# Patient Record
Sex: MALE | Race: ASIAN | NOT HISPANIC OR LATINO | Employment: UNEMPLOYED | URBAN - METROPOLITAN AREA
[De-identification: names, ages, dates, MRNs, and addresses within clinical notes are randomized per-mention and may not be internally consistent; named-entity substitution may affect disease eponyms.]

---

## 2018-01-05 ENCOUNTER — RECORDS - HEALTHEAST (OUTPATIENT)
Dept: LAB | Facility: CLINIC | Age: 53
End: 2018-01-05

## 2018-01-05 LAB
ALBUMIN SERPL-MCNC: 3.9 G/DL (ref 3.5–5)
ALP SERPL-CCNC: 51 U/L (ref 45–120)
ALT SERPL W P-5'-P-CCNC: 26 U/L (ref 0–45)
ANION GAP SERPL CALCULATED.3IONS-SCNC: 8 MMOL/L (ref 5–18)
AST SERPL W P-5'-P-CCNC: 26 U/L (ref 0–40)
BILIRUB SERPL-MCNC: 0.8 MG/DL (ref 0–1)
BUN SERPL-MCNC: 19 MG/DL (ref 8–22)
CALCIUM SERPL-MCNC: 9 MG/DL (ref 8.5–10.5)
CHLORIDE BLD-SCNC: 106 MMOL/L (ref 98–107)
CHOLEST SERPL-MCNC: 213 MG/DL
CO2 SERPL-SCNC: 27 MMOL/L (ref 22–31)
CREAT SERPL-MCNC: 1.02 MG/DL (ref 0.7–1.3)
FASTING STATUS PATIENT QL REPORTED: NO
GFR SERPL CREATININE-BSD FRML MDRD: >60 ML/MIN/1.73M2
GLUCOSE BLD-MCNC: 81 MG/DL (ref 70–125)
HDLC SERPL-MCNC: 41 MG/DL
LDLC SERPL CALC-MCNC: 141 MG/DL
POTASSIUM BLD-SCNC: 3.8 MMOL/L (ref 3.5–5)
PROT SERPL-MCNC: 7.1 G/DL (ref 6–8)
SODIUM SERPL-SCNC: 141 MMOL/L (ref 136–145)
TRIGL SERPL-MCNC: 157 MG/DL

## 2019-10-08 ENCOUNTER — OFFICE VISIT - HEALTHEAST (OUTPATIENT)
Dept: FAMILY MEDICINE | Facility: CLINIC | Age: 54
End: 2019-10-08

## 2019-10-08 DIAGNOSIS — Z00.00 WELL ADULT EXAM: ICD-10-CM

## 2019-10-08 DIAGNOSIS — I10 ESSENTIAL HYPERTENSION, BENIGN: ICD-10-CM

## 2019-10-08 RX ORDER — CETIRIZINE HYDROCHLORIDE 10 MG/1
10 TABLET ORAL DAILY
Status: SHIPPED | COMMUNITY
Start: 2019-10-08 | End: 2021-07-28

## 2019-10-08 ASSESSMENT — MIFFLIN-ST. JEOR: SCORE: 1518.69

## 2019-10-15 ENCOUNTER — AMBULATORY - HEALTHEAST (OUTPATIENT)
Dept: LAB | Facility: CLINIC | Age: 54
End: 2019-10-15

## 2019-10-15 DIAGNOSIS — Z00.00 WELL ADULT EXAM: ICD-10-CM

## 2019-10-15 LAB
ALBUMIN SERPL-MCNC: 4.1 G/DL (ref 3.5–5)
ALP SERPL-CCNC: 59 U/L (ref 45–120)
ALT SERPL W P-5'-P-CCNC: 26 U/L (ref 0–45)
ANION GAP SERPL CALCULATED.3IONS-SCNC: 10 MMOL/L (ref 5–18)
AST SERPL W P-5'-P-CCNC: 23 U/L (ref 0–40)
BILIRUB SERPL-MCNC: 0.9 MG/DL (ref 0–1)
BUN SERPL-MCNC: 15 MG/DL (ref 8–22)
CALCIUM SERPL-MCNC: 9.1 MG/DL (ref 8.5–10.5)
CHLORIDE BLD-SCNC: 103 MMOL/L (ref 98–107)
CHOLEST SERPL-MCNC: 214 MG/DL
CO2 SERPL-SCNC: 26 MMOL/L (ref 22–31)
CREAT SERPL-MCNC: 0.97 MG/DL (ref 0.7–1.3)
ERYTHROCYTE [DISTWIDTH] IN BLOOD BY AUTOMATED COUNT: 11.2 % (ref 11–14.5)
FASTING STATUS PATIENT QL REPORTED: YES
GFR SERPL CREATININE-BSD FRML MDRD: >60 ML/MIN/1.73M2
GLUCOSE BLD-MCNC: 93 MG/DL (ref 70–125)
HCT VFR BLD AUTO: 44.6 % (ref 40–54)
HDLC SERPL-MCNC: 53 MG/DL
HGB BLD-MCNC: 15.5 G/DL (ref 14–18)
LDLC SERPL CALC-MCNC: 117 MG/DL
MCH RBC QN AUTO: 32.2 PG (ref 27–34)
MCHC RBC AUTO-ENTMCNC: 34.8 G/DL (ref 32–36)
MCV RBC AUTO: 93 FL (ref 80–100)
PLATELET # BLD AUTO: 141 THOU/UL (ref 140–440)
PMV BLD AUTO: 6.9 FL (ref 7–10)
POTASSIUM BLD-SCNC: 4 MMOL/L (ref 3.5–5)
PROT SERPL-MCNC: 6.9 G/DL (ref 6–8)
RBC # BLD AUTO: 4.82 MILL/UL (ref 4.4–6.2)
SODIUM SERPL-SCNC: 139 MMOL/L (ref 136–145)
TRIGL SERPL-MCNC: 222 MG/DL
TSH SERPL DL<=0.005 MIU/L-ACNC: 2.44 UIU/ML (ref 0.3–5)
WBC: 5.6 THOU/UL (ref 4–11)

## 2019-10-16 ENCOUNTER — COMMUNICATION - HEALTHEAST (OUTPATIENT)
Dept: FAMILY MEDICINE | Facility: CLINIC | Age: 54
End: 2019-10-16

## 2020-04-07 ENCOUNTER — COMMUNICATION - HEALTHEAST (OUTPATIENT)
Dept: FAMILY MEDICINE | Facility: CLINIC | Age: 55
End: 2020-04-07

## 2021-05-11 ENCOUNTER — COMMUNICATION - HEALTHEAST (OUTPATIENT)
Dept: SCHEDULING | Facility: CLINIC | Age: 56
End: 2021-05-11

## 2021-05-12 ENCOUNTER — OFFICE VISIT - HEALTHEAST (OUTPATIENT)
Dept: FAMILY MEDICINE | Facility: CLINIC | Age: 56
End: 2021-05-12

## 2021-05-12 DIAGNOSIS — I10 ESSENTIAL HYPERTENSION, BENIGN: ICD-10-CM

## 2021-05-12 DIAGNOSIS — J30.2 SEASONAL AND PERENNIAL ALLERGIC RHINITIS: ICD-10-CM

## 2021-05-12 DIAGNOSIS — J30.89 SEASONAL AND PERENNIAL ALLERGIC RHINITIS: ICD-10-CM

## 2021-05-12 DIAGNOSIS — K62.3 RECTAL PROLAPSE: ICD-10-CM

## 2021-05-12 DIAGNOSIS — H10.13 PERENNIAL ALLERGIC CONJUNCTIVITIS OF BOTH EYES: ICD-10-CM

## 2021-05-12 RX ORDER — ALBUTEROL SULFATE 90 UG/1
2 AEROSOL, METERED RESPIRATORY (INHALATION) EVERY 6 HOURS PRN
Qty: 1 EACH | Refills: 0 | Status: SHIPPED | OUTPATIENT
Start: 2021-05-12 | End: 2021-07-28

## 2021-05-12 RX ORDER — OLOPATADINE HYDROCHLORIDE 1 MG/ML
1 SOLUTION/ DROPS OPHTHALMIC 2 TIMES DAILY
Qty: 5 ML | Refills: 1 | Status: SHIPPED | OUTPATIENT
Start: 2021-05-12 | End: 2021-07-28

## 2021-05-12 RX ORDER — LOSARTAN POTASSIUM 50 MG/1
TABLET ORAL
Qty: 90 TABLET | Refills: 3 | Status: SHIPPED | OUTPATIENT
Start: 2021-05-12

## 2021-05-27 VITALS
DIASTOLIC BLOOD PRESSURE: 78 MMHG | SYSTOLIC BLOOD PRESSURE: 136 MMHG | WEIGHT: 171.9 LBS | HEART RATE: 71 BPM | OXYGEN SATURATION: 98 %

## 2021-06-02 NOTE — TELEPHONE ENCOUNTER
----- Message from Rubi Friedman PA-C sent at 10/16/2019  9:56 AM CDT -----  Total cholesterol is stable and triglycerides have gotten worse. Diet change is an important next step, include more raw vegetables in your diet and reduce grains and starches like rice. These are quickly changed to triglycerides and sugar. We could also consider starting a cholesterol lowering medication if you prefer. Please let me know and I will send this to your pharmacy.  please call results to the patient or send a letter if not reachable by phone.

## 2021-06-02 NOTE — PROGRESS NOTES
MALE PREVENTATIVE EXAM    Assessment and Plan:           1. Well adult exam  Advised health diet, 6-8 fruit and vegetables daily and discussed juicing in am with protein powder for breakfast.  Advised regular exercise, discussed regular aerobic exercise and strength training.  Advised not more than 1 alcoholic drinks in any given day.  Caffeine: not more than 2 daily.  Water: 6-8 glasses daily.  Multivitamin with Vitamin D 2000 units and iron for women.    - HM2(CBC w/o Differential); Future  - Lipid Cascade; Future  - Thyroid [place]; Future  - Comprehensive Metabolic Panel; Future    2. Essential hypertension, benign  Discussed the need to restart his Cozaar.  Explained that it will keep his blood pressure under control.  He is willing to restart.  Follow-up in 6 months for hypertension.    - losartan (COZAAR) 50 MG tablet; TAKE 1 PILL BY MOUTH DAILY FOR BLOOD PRESSURE / TXHUA HNUB NOJ 1 LUB TSHUAJ PAB ZOO TAMIR NTSHAV SIAB  Dispense: 90 tablet; Refill: 3       Next follow up:  Return in about 6 months (around 4/8/2020) for Hypertension.    Immunization Review  Adult Imm Review: Due today, orders placed  BMI: 29  Non-smoker    I discussed the following with the patient:   Adult Healthy Living: Importance of regular exercise  Healthy nutrition  Supplement use        Subjective:   Chief Complaint: Romina Rivera is an 54 y.o. male here for a preventative health visit.     HPI:  Romina Rivera is seen for his physical.  He has a history of high blood pressure, recently was started on losartan but only given 14 days worth.  He was supposed to follow-up, did not continue to take the medication because he ran out.  He has a history of fall allergies and spring allergies take Zyrtec occasionally.  Review of systems positive for vision change, occasional wheezing, chronic low back pain.  ROS: Patient denies fever, chills, sweats, fainting, fatigue, weight change, dizziness, sleep problems, chest pain, palpitations, shortness of  "breath, wheezing, cough,  sore throat, changes in hearing, ear pain,tinnitus,  disphagia, sore throat, globus, changes in vision, eye pain eye redness, acid reflux, nausea, vomiting, diarrhea, constipation, black or bloody stools,  Dysuria, frequency, urinary incontinence, nocturia, hematuria, back pain,joint pain, bone pain, muscle cramps,edema, weakness, numbness, tingling of extremities, rash, itching, skin changes, swollen lymph nodes, thirst, increased urination, breast lumps, breast pain, nipple discharge, memory difficulties, anxiety, mood swings, (female)vaginal discharge, dyspareunia, menorrhagia, pelvic pain, sexual dysfunction,   (male) testicular lumps, erectile dysfunction.      Healthy Habits  Are you taking a daily aspirin? No  Do you typically exercising at least 40 min, 3-4 times per week?  NO  Do you usually eat at least 4 servings of fruit and vegetables a day, include whole grains and fiber and avoid regularly eating high fat foods? Yes  Have you had an eye exam in the past two years? Yes  Do you see a dentist twice per year? NO  Do you have any concerns regarding sleep? No    Safety Screen  If you own firearms, are they secured in a locked gun cabinet or with trigger locks? The patient does not own any firearms  Do you feel you are safe where you are living?: Yes (10/8/2019  4:51 PM)      Review of Systems:  Please see above.  The rest of the review of systems are negative for all systems.     Cancer Screening       Status Date      COLONOSCOPY Next Due 9/1/2024      Done 9/1/2014           Patient Care Team:  Rubi Friedman PA-C as PCP - General (Physician Assistant)        History     Reviewed By Date/Time Sections Reviewed    Vashti Mendez CMA 10/8/2019  4:54 PM Tobacco            Objective:   Vital Signs:   Visit Vitals  /84 (Patient Site: Right Arm, Patient Position: Sitting, Cuff Size: Adult Regular)   Pulse 60   Temp 99.1  F (37.3  C) (Oral)   Ht 5' 4.5\" (1.638 m) "   Wt 169 lb 11.2 oz (77 kg)   SpO2 98%   BMI 28.68 kg/m           PHYSICAL EXAM  PHYSICAL EXAM  Alert, cooperative, well-hydrated.  Appears well.  Eyes: Pupils equal, round, reactive to light.  HEENT: Sclera white, nares patent, MMM, TM's pearly bilaterally, neck supple, no lymph adenopathy, thyroid without organomegaly and freely movable, no nodules or masses.   Lungs: Clear to auscultation. No retractions, no increased work of respiration, equal chest rise.   Heart: Regular rate and rhythm, no murmurs, clicks,    Gallops.Sitting/StandingiSupine.  Abdomen: Soft, bowel sounds in 4 quadrants with no tenderness to palpation, no organomegaly or masses, no aortic or renal bruits.  Extremities: no tenderness to palpation of gastrocnemius, bilaterally.  Skin: no increased warmth, edema, or erythema of lower legs bilaterally.  Back:  No cervical, thoracic or lumbar tenderness to spinous processes or musculature.    Neuro::pupils equal and reactive to light bilaterally,EOMFI, no nystagmus, no strabismus,  CN II - XII grossly intact. No focal motor/sensory deficits. Rhomberg negative, finger-nose-finger negative.    Mood: smiles easily, affect full, good eye contact, converses well, no pressured speech, no psychomotor agitation, no suicidal or homicidal ideations.    The 10-year ASCVD risk score ([place] VERENICE Jr., et al., 2013) is: 8.1%    Values used to calculate the score:      Age: 54 years      Sex: Male      Is Non- : No      Diabetic: No      Tobacco smoker: No      Systolic Blood Pressure: 132 mmHg      Is BP treated: Yes      HDL Cholesterol: 41 mg/dL      Total Cholesterol: 213 mg/dL        Additional Screenings Completed Today:

## 2021-06-02 NOTE — PROGRESS NOTES
Total cholesterol is stable and triglycerides have gotten worse. Diet change is an important next step, include more raw vegetables in your diet and reduce grains and starches like rice. These are quickly changed to triglycerides and sugar. We could also consider starting a cholesterol lowering medication if you prefer. Please let me know and I will send this to your pharmacy.  please call results to the patient or send a letter if not reachable by phone.

## 2021-06-03 VITALS
HEIGHT: 65 IN | WEIGHT: 169.7 LBS | HEART RATE: 60 BPM | OXYGEN SATURATION: 98 % | TEMPERATURE: 99.1 F | SYSTOLIC BLOOD PRESSURE: 132 MMHG | BODY MASS INDEX: 28.27 KG/M2 | DIASTOLIC BLOOD PRESSURE: 84 MMHG

## 2021-06-07 NOTE — TELEPHONE ENCOUNTER
LM to patient for the 4th time no answer, will cancel appointment for April 13 at 4:20pm clinic in hours for face-to face are 9-4, if patient calls back please schedule appointment in clinic hours only.

## 2021-06-07 NOTE — TELEPHONE ENCOUNTER
LMTCB Patient has appointment April 13 at 4:20pm asking if patient cane come in at 3:05pm or even earlier and get travel screening questions done prior due to COVID-19 we must do travel questions and hours for in clinic patients are now 9-4

## 2021-06-17 NOTE — TELEPHONE ENCOUNTER
Romina feels that he has a hernia in rectal area.  Symptoms started a few years back.  When Romina feels that when he sits down his rectal area hangs down about one inch.  Rectal prolapse.  Each time he needs to push back in.      COVID 19 Nurse Triage Plan/Patient Instructions    Please be aware that novel coronavirus (COVID-19) may be circulating in the community. If you develop symptoms such as fever, cough, or SOB or if you have concerns about the presence of another infection including coronavirus (COVID-19), please contact your health care provider or visit  [email or web address].    Disposition/Instructions    In-Person Visit with provider recommended. Reference Visit Selection Guide.    Thank you for taking steps to prevent the spread of this virus.  o Limit your contact with others.  o Wear a simple mask to cover your cough.  o Wash your hands well and often.    Resources    [facility]: About COVID-19: [email or web address]    CDC: What to Do If You're Sick: www.cdc.gov/coronavirus/2019-ncov/about/steps-when-sick.html    CDC: Ending Home Isolation: www.cdc.gov/coronavirus/2019-ncov/hcp/disposition-in-home-patients.html     CDC: Caring for Someone: www.cdc.gov/coronavirus/2019-ncov/if-you-are-sick/care-for-someone.html     [facility]: Interim Guidance for Hospital Discharge to Home: www.health.[facility].mn.us/diseases/coronavirus/hcp/hospdischarge.pdf    [facility] clinical trials (COVID-19 research studies): clinicalaffairs.[facility]/n-clinical-trials     Below are the COVID-19 hotlines at the [place] Department of Health ([facility]). Interpreters are available.   o For health questions: Call 078-395-4903 or 1-620.494.4364 (7 a.m. to 7 p.m.)  o For questions about schools and childcare: Call 756-159-3354 or 1-289.843.1337 (7 a.m. to 7 p.m.)       Reason for Disposition    Patient wants to be seen    Additional Information    Negative: SEVERE rectal bleeding (large blood clots; on and  off, or constant bleeding)    Negative: SEVERE dizziness (e.g., unable to stand, requires support to walk, feels like passing out now)    Negative: MODERATE rectal bleeding (small blood clots, passing blood without stool, or toilet water turns red) more than once a day    Negative: Bloody, black, or tarry bowel movements (Exception: chronic-unchanged  black-grey bowel movements and is taking iron pills or Pepto-bismol)    Negative: High-risk adult (e.g., prior surgery on aorta, abdominal aortic aneurysm)    Negative: Rectal foreign body (inserted or swallowed)    Negative: SEVERE abdominal pain (e.g., excruciating)    Negative: Constant abdominal pain lasting > 2 hours    Negative: Pale skin (pallor) of new onset or worsening    Negative: Patient sounds very sick or weak to the triager    Negative: MODERATE rectal bleeding (small blood clots, passing blood without stool, or toilet water turns red)    Negative: Taking Coumadin (warfarin) or other strong blood thinner, or known bleeding disorder (e.g., thrombocytopenia)    Negative: Colonoscopy in past 72 hours    Negative: Known cirrhosis of the liver (or history of liver failure or ascites)    Protocols used: RECTAL BLEEDING-A-[place]

## 2021-06-18 NOTE — PATIENT INSTRUCTIONS - HE
Patient Instructions by Wilmar Siddiqi MD at 5/12/2021  1:20 PM     Author: Wilmar Siddiqi MD Service: -- Author Type: Physician    Filed: 5/12/2021  1:52 PM Encounter Date: 5/12/2021 Status: Addendum    : Wilmar Siddiqi MD (Physician)    Related Notes: Original Note by Wilmar Siddiqi MD (Physician) filed at 5/12/2021  1:50 PM         I placed a referral for you to see the colon/rectal surgeons for consultation regarding rectal prolapse.      Please resume use of losartan 50mg daily.  I would recommend checking your blood pressure a couple times a week and would ask that you let me know if your readings are >140 systolic or >90 diastoic, despite restarting the losartan.      Please let me know if your allergy symptoms are not well controlled with use of the eye drops, nasal spray and inhaler.     Rectal Prolapse  Rectal prolapse means the rectum has fallen out of position. In many cases, rectal tissue sags out of the anus. This happens when the rectum becomes stretched out, pushes down, and sags out through the anus. A reddish mass of tissue may be seen or felt at the opening of the anus. The tissue may stick out beyond the anus. This may happen following a bowel movement, and then go away for a time. Or it may be present all the time. Stool or mucus may also leak out of the anus.  Rectal prolapse happens when the structures and muscles in the pelvis and anus are weakened. Although it is not common, it is more so in elderly women, but it can happen in both men and women of all ages. Pregnancy and childbirth can make it more likely. So can repeated straining to have a bowel movement.  Often people have a hemorrhoid, which they think is a prolapse, as they see or feel something abnormal, and many of the symptoms are the same.  The following are signs and symptoms of a rectal prolapse:    Diarrhea or constipation    Stool leaking out    Mucus or blood in the stool    Incomplete bowel  movements    Abdominal discomfort  To return the rectum back into place, apply gentle pressure. But it will likely prolapse again. For long-term treatment, you may need surgery. You will likely be referred to a specialist who can examine you and tell you about your options.    Home care  Straining during bowel movements is often a cause of prolapse. To help ease and prevent constipation, take these steps:    Laxatives, stool softeners, or bulking agents may be prescribed. Take these as directed. You may need them daily.    Add more fruits and vegetables and whole grains to your diet. Eat less high-fat foods and processed foods like packaged snack foods.    Don't ignore the urge to have a bowel movement. Once a day, set aside time after a meal for an undisturbed visit to the toilet.    Don't strain or push hard to pass stool.    Don't spend more than a few minutes on the toilet to have a bowel movement.    If you smoke, quit.  If you have a prolapse:  To return a prolapsed rectum back into place, first wash your hands well. Wet a soft cloth with warm water. Then, lie on your side with your knees to your chest. Hold the cloth to the anus and use gentle pressure to push the rectum back into place. When youre done, call the doctor. If you are unable to push the prolapse back or are uncomfortable doing so, call your healthcare provider or go to the emergency department.  Follow-up care  Follow up with your healthcare provider, or as advised. If you have been referred to a specialist, make the appointment right away.  Call 911  Call 911 if any of the following occur:    Heavy bleeding    Severe abdominal pain or swelling    Severe rectal pain    Fainting or loss of consciousness    Rapid heart rate  When to seek medical advice  Call your healthcare provider right away if any of these occur:    Return of the prolapse    Fever of 100.4 F (38 C) or higher, or as directed by your healthcare provider    Blood in  stool    Abdominal pain or swelling    Repeated vomiting    You cannot have a bowel movement or cannot control bowel movements  Date Last Reviewed: 6/1/2016 2000-2017 [employer]. [address] [place] [address], [place] [address]. All rights reserved. This information is not intended as a substitute for professional medical care. Always follow your healthcare professional's instructions.         I would recommend the following providers here at the [facility], if you would like to establish care with a new primary care provider:     -Mara Craig DO (family medicine)   -Mary Bar MD (internal medicine/pediatrics)  -Jaclyn Gutierrez CNP (internal medicine)  -ARIES Ambrosio (internal medicine)  -Cyrus Johnson MD (family medicine)    I would be happy to see you in the future, though I will be floating [place] clinics [place], based on the needs of the various clinics. Because of that, I don't have a panel of patients for whom I provide continuity primary care.

## 2021-06-19 NOTE — LETTER
Letter by Rubi Friedman PA-C at      Author: Rubi Friedman PA-C Service: -- Author Type: --    Filed:  Encounter Date: 10/16/2019 Status: Signed         Tswv EDIE Rivera  [address] [address]             October 16, 2019         Dear Mr. Rivera,    Below are the results from your recent visit:    Total cholesterol is stable and triglycerides have gotten worse. Diet change is an important next step, include more raw vegetables in your diet and reduce grains and starches like rice. These are quickly changed to triglycerides and sugar. We could also consider starting a cholesterol lowering medication if you prefer. Please let me know and I will send this to your pharmacy.      Resulted Orders   HM2(CBC w/o Differential)   Result Value Ref Range    WBC 5.6 4.0 - 11.0 thou/uL    RBC 4.82 4.40 - 6.20 mill/uL    Hemoglobin 15.5 14.0 - 18.0 g/dL    Hematocrit 44.6 40.0 - 54.0 %    MCV 93 80 - 100 fL    MCH 32.2 27.0 - 34.0 pg    MCHC 34.8 32.0 - 36.0 g/dL    RDW 11.2 11.0 - 14.5 %    Platelets 141 140 - 440 thou/uL    MPV 6.9 (L) 7.0 - 10.0 fL   Lipid Cascade   Result Value Ref Range    Cholesterol 214 (H) <=199 mg/dL    Triglycerides 222 (H) <=149 mg/dL    HDL Cholesterol 53 >=40 mg/dL    LDL Calculated 117 <=129 mg/dL    Patient Fasting > 8hrs? Yes    Thyroid Cascade   Result Value Ref Range    TSH 2.44 0.30 - 5.00 uIU/mL   Comprehensive Metabolic Panel   Result Value Ref Range    Sodium 139 136 - 145 mmol/L    Potassium 4.0 3.5 - 5.0 mmol/L    Chloride 103 98 - 107 mmol/L    CO2 26 22 - 31 mmol/L    Anion Gap, Calculation 10 5 - 18 mmol/L    Glucose 93 70 - 125 mg/dL    BUN 15 8 - 22 mg/dL    Creatinine 0.97 0.70 - 1.30 mg/dL    GFR MDRD Af Amer >60 >60 mL/min/1.73m2    GFR MDRD Non Af Amer >60 >60 mL/min/1.73m2    Bilirubin, Total 0.9 0.0 - 1.0 mg/dL    Calcium 9.1 8.5 - 10.5 mg/dL    Protein, Total 6.9 6.0 - 8.0 g/dL    Albumin 4.1 3.5 - 5.0 g/dL    Alkaline Phosphatase 59 45 - 120 U/L    AST 23  0 - 40 U/L    ALT 26 0 - 45 U/L    Narrative    Fasting Glucose reference range is 70-99 mg/dL per  American Diabetes Association (ADA) guidelines.           Please call with questions or contact us using MyChart.    Sincerely,        Electronically signed by Rubi Friedman PA-C

## 2021-06-30 NOTE — PROGRESS NOTES
Progress Notes by Wilmar Siddiqi MD at 5/12/2021  1:20 PM     Author: Wilmar Siddiqi MD Service: -- Author Type: Physician    Filed: 5/12/2021  4:13 PM Encounter Date: 5/12/2021 Status: Signed    : Wilmar Siddiqi MD (Physician)       Assessment / Plan  1. Rectal prolapse  Ambulatory referral to Colorectal Surgery - Colon and rectal surgery associate, [facility]   2. Essential hypertension, benign  losartan (COZAAR) 50 MG tablet   3. Seasonal and perennial allergic rhinitis  albuterol (PROAIR HFA;PROVENTIL HFA;VENTOLIN HFA) 90 mcg/actuation inhaler    fluticasone (VERAMYST) 27.5 mcg/actuation nasal spray   4. Perennial allergic conjunctivitis of both eyes  olopatadine (PATANOL) 0.1 % ophthalmic solution       Patient Instructions     I placed a referral for you to see the colon/rectal surgeons for consultation regarding rectal prolapse.      Please resume use of losartan 50mg daily.  I would recommend checking your blood pressure a couple times a week and would ask that you let me know if your readings are >140 systolic or >90 diastoic, despite restarting the losartan.      Please let me know if your allergy symptoms are not well controlled with use of the eye drops, nasal spray and inhaler.     Return in about 6 months (around 11/12/2021) for recheck blood pressure and establish care with new primary care provider .     41 minutes spent on the date of the encounter doing chart review, history and exam, documentation and further activities per the note.    Subjective   Tswv EDIE Rivera is a 56 y.o. year old male who presents with the following concerns:     Rectal prolapse - patient presents with concern of rectal prolapse.  When he sits on the toilet or urinates, he notes that rectal area protrudes about an inch, primarily on the right side.  After standing up he needs to apply manual pressure to the prolapse to reduce it.  This process sometimes takes several minutes.  Patient notes that bowel  movements are very regular, once or twice daily.  He doesn't typically need to strain to pass a bowel movement.  He denies any bloody or tarry stools.  No associated abdominal pain.       History of hypertension - previously treated successfully with losartan 50mg daily.  Has been out of his medication for the past 5 months.  No chest pain, palpitations, shortness of breath, or peripheral swelling. Is interested in resuming medication.      History of allergic rhinitis- is currently noting very itchy eyes.  Has previously used antihistamine eyedrops, nasal steroids, and inhaled beta agonists to help relieve the symptoms.  He has not been prescribed these medications recently but would like to have these available to him.  He notes over the last few weeks symptoms have been quite bothersome to him including nasal congestion, rhinorrhea, itchy nose and throat, itchy eyes, as well as some occasional wheezing.    Patient Active Problem List   Diagnosis   ? Acute conjunctivitis   ? Allergic rhinitis   ? Migraine without aura     No past surgical history on file.    Social History     Tobacco Use   ? Smoking status: Never Smoker   ? Smokeless tobacco: Never Used   Substance Use Topics   ? Alcohol use: Yes     Frequency: 2-4 times a month     No family history on file.      Current Outpatient Medications   Medication Sig Dispense Refill   ? multivitamin (ONE A DAY) per tablet 1 cap(s)     ? cetirizine (ZYRTEC) 10 MG tablet Take 10 mg by mouth daily.     ? losartan (COZAAR) 50 MG tablet TAKE 1 PILL BY MOUTH DAILY FOR BLOOD PRESSURE / TXHUA HNUB NOJ 1 LUB TSHUAJ PAB ZOO TAMIR NTSHAV SIAB 90 tablet 3     No current facility-administered medications for this visit.      Allergies   Allergen Reactions   ? Eggs-Apples-Oats [Nutritional Supplement-Fiber]    ? Mold    ? Pollens Extract    ? Wheat        ROS:   Negative except as noted above in HPI.     Objective  /78 (Patient Site: Right Arm, Patient Position: Sitting, Cuff  "Size: Adult Regular)   Pulse 71   Wt 171 lb 14.4 oz (78 kg)   SpO2 98%   BMI 29.05 kg/m       General: Alert, no acute distress.   Affect: pleasant, cooperative.  HEENT: normocephalic/atraumatic, conjunctivae are clear,  Moderately congested erythematous nasal mucosa.  Oropharynx is moist and clear, without tonsillar hypertrophy, asymmetry, erythema or exudate.  Neck: supple without adenopathy or thyromegaly.  Lungs: clear bilateral with few scattered end expiratory wheezes.    Heart: regular rate and rhythm, normal S1 and S2, no murmurs   Rectal: with bearing down, rectum does protrude approx 1\", though reduces within a few seconds when patient relaxes. Protrusion appears more pronounced on the right side.        Wilmar Siddiqi MD   Family medicine physician  [facility]          "

## 2021-07-28 ENCOUNTER — OFFICE VISIT (OUTPATIENT)
Dept: FAMILY MEDICINE | Facility: CLINIC | Age: 56
End: 2021-07-28
Payer: COMMERCIAL

## 2021-07-28 VITALS
HEART RATE: 64 BPM | RESPIRATION RATE: 20 BRPM | DIASTOLIC BLOOD PRESSURE: 74 MMHG | WEIGHT: 166.9 LBS | HEIGHT: 64 IN | BODY MASS INDEX: 28.49 KG/M2 | SYSTOLIC BLOOD PRESSURE: 100 MMHG

## 2021-07-28 DIAGNOSIS — Z11.1 SCREENING EXAMINATION FOR PULMONARY TUBERCULOSIS: ICD-10-CM

## 2021-07-28 DIAGNOSIS — K64.4 EXTERNAL HEMORRHOIDS: ICD-10-CM

## 2021-07-28 DIAGNOSIS — Z01.812 ENCOUNTER FOR PREOPERATIVE SCREENING LABORATORY TESTING FOR COVID-19 VIRUS: ICD-10-CM

## 2021-07-28 DIAGNOSIS — Z01.818 PREOP GENERAL PHYSICAL EXAM: Primary | ICD-10-CM

## 2021-07-28 DIAGNOSIS — Z23 NEED FOR DIPHTHERIA-TETANUS-PERTUSSIS (TDAP) VACCINE: ICD-10-CM

## 2021-07-28 DIAGNOSIS — G47.30 SLEEP-DISORDERED BREATHING: ICD-10-CM

## 2021-07-28 DIAGNOSIS — Z11.52 ENCOUNTER FOR PREOPERATIVE SCREENING LABORATORY TESTING FOR COVID-19 VIRUS: ICD-10-CM

## 2021-07-28 LAB
ALBUMIN SERPL-MCNC: 4 G/DL (ref 3.5–5)
ALP SERPL-CCNC: 73 U/L (ref 45–120)
ALT SERPL W P-5'-P-CCNC: 34 U/L (ref 0–45)
ANION GAP SERPL CALCULATED.3IONS-SCNC: 8 MMOL/L (ref 5–18)
AST SERPL W P-5'-P-CCNC: 28 U/L (ref 0–40)
BILIRUB SERPL-MCNC: 1.1 MG/DL (ref 0–1)
BUN SERPL-MCNC: 19 MG/DL (ref 8–22)
CALCIUM SERPL-MCNC: 8.9 MG/DL (ref 8.5–10.5)
CHLORIDE BLD-SCNC: 105 MMOL/L (ref 98–107)
CO2 SERPL-SCNC: 28 MMOL/L (ref 22–31)
CREAT SERPL-MCNC: 0.97 MG/DL (ref 0.7–1.3)
GFR SERPL CREATININE-BSD FRML MDRD: 87 ML/MIN/1.73M2
GLUCOSE BLD-MCNC: 67 MG/DL (ref 70–125)
POTASSIUM BLD-SCNC: 3.8 MMOL/L (ref 3.5–5)
PROT SERPL-MCNC: 6.8 G/DL (ref 6–8)
SODIUM SERPL-SCNC: 141 MMOL/L (ref 136–145)

## 2021-07-28 PROCEDURE — 36415 COLL VENOUS BLD VENIPUNCTURE: CPT | Performed by: FAMILY MEDICINE

## 2021-07-28 PROCEDURE — 86481 TB AG RESPONSE T-CELL SUSP: CPT | Performed by: FAMILY MEDICINE

## 2021-07-28 PROCEDURE — 90715 TDAP VACCINE 7 YRS/> IM: CPT | Performed by: FAMILY MEDICINE

## 2021-07-28 PROCEDURE — 90471 IMMUNIZATION ADMIN: CPT | Performed by: FAMILY MEDICINE

## 2021-07-28 PROCEDURE — 80053 COMPREHEN METABOLIC PANEL: CPT | Performed by: FAMILY MEDICINE

## 2021-07-28 PROCEDURE — 99214 OFFICE O/P EST MOD 30 MIN: CPT | Mod: 25 | Performed by: FAMILY MEDICINE

## 2021-07-28 ASSESSMENT — MIFFLIN-ST. JEOR: SCORE: 1502.02

## 2021-07-28 NOTE — PROGRESS NOTES
[address] [address]  Phone: [phone]  Fax: [phone]  Primary Provider: Rubi Friedman  Pre-op Performing Provider: INDIA CASTELLANOS    PREOPERATIVE EVALUATION:  Today's date: 7/28/2021    Romina Rivera is a 56 year old male who presents for a preoperative evaluation.    Surgical Information:  Surgery/Procedure: Rectal Surgery for Hemorrhoids  Surgery Location: [facility]    Surgeon: Georgi  Surgery Date: 8/5/2021  Time of Surgery: 9:30am  Where patient plans to recover: At home with family  Fax number for surgical facility: Note does not need to be faxed, will be available electronically in Epic.    Type of Anesthesia Anticipated: General    Assessment & Plan     The proposed surgical procedure is considered INTERMEDIATE risk.    Romina was seen today for pre-op exam.    Diagnoses and all orders for this visit:    Preop general physical exam  -     Comprehensive metabolic panel (BMP + Alb, Alk Phos, ALT, AST, Total. Bili, TP); Future  -     Comprehensive metabolic panel (BMP + Alb, Alk Phos, ALT, AST, Total. Bili, TP)    External hemorrhoids    Need for diphtheria-tetanus-pertussis (Tdap) vaccine    Sleep-disordered breathing  -     SLEEP EVALUATION & MANAGEMENT REFERRAL - ADULT -; Future    Encounter for preoperative screening laboratory testing for COVID-19 virus  -     Asymptomatic COVID-19 Virus (Coronavirus) by PCR; Future    Other orders  -     TDAP VACCINE (Adacel, Boostrix)  [[id number]]      Medication Instructions:   - ACE/ARB: May be continued on the day of surgery.     RECOMMENDATION:  APPROVAL GIVEN to proceed with proposed procedure, without further diagnostic evaluation.     Subjective     HPI related to upcoming procedure: patient with persistent symptomatic hemorrhoids.     Preop Questions 7/28/2021   1. Have you ever had a heart attack or stroke? No   2. Have you ever had surgery on your heart or blood  vessels, such as a stent placement, a coronary artery bypass, or surgery on an artery in your head, neck, heart, or legs? No   3. Do you have chest pain with activity? No   4. Do you have a history of  heart failure? No   5. Do you currently have a cold, bronchitis or symptoms of other infection? No   6. Do you have a cough, shortness of breath, or wheezing? No   7. Do you or anyone in your family have previous history of blood clots? No   8. Do you or does anyone in your family have a serious bleeding problem such as prolonged bleeding following surgeries or cuts? No   9. Have you ever had problems with anemia or been told to take iron pills? No   10. Have you had any abnormal blood loss such as black, tarry or bloody stools? No   11. Have you ever had a blood transfusion? No   12. Are you willing to have a blood transfusion if it is medically needed before, during, or after your surgery? Yes   13. Have you or any of your relatives ever had problems with anesthesia? No   14. Do you have sleep apnea, excessive snoring or daytime drowsiness? UNKNOWN - SEE STOP-BANG QUESTIONNAIRE ABOVE.    14a. Do you have a CPAP machine? No   15. Do you have any artifical heart valves or other implanted medical devices like a pacemaker, defibrillator, or continuous glucose monitor? No   16. Do you have artificial joints? No   17. Are you allergic to latex? No       Health Care Directive:  Patient does not have a Health Care Directive or Living Will: Discussed advance care planning with patient; however, patient declined at this time.    Preoperative Review of :   reviewed - no record of controlled substances prescribed.     Review of Systems  CONSTITUTIONAL: NEGATIVE for fever, chills, change in weight  INTEGUMENTARY/SKIN: NEGATIVE for worrisome rashes, moles or lesions  EYES: NEGATIVE for vision changes or irritation  ENT/MOUTH: NEGATIVE for ear, mouth and throat problems  RESP: NEGATIVE for significant cough or SOB.  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  NO KNOWN HISTORY OF TUBERCULOSIS INFECTION.   CV: NEGATIVE for chest pain, palpitations or peripheral edema  GI: NEGATIVE for nausea, abdominal pain, heartburn, or change in bowel habits  : NEGATIVE for frequency, dysuria, or hematuria  MUSCULOSKELETAL: NEGATIVE for significant arthralgias or myalgia  NEURO: NEGATIVE for weakness, dizziness or paresthesias  ENDOCRINE: NEGATIVE for temperature intolerance, skin/hair changes  HEME: NEGATIVE for bleeding problems  PSYCHIATRIC: NEGATIVE for changes in mood or affect    Patient Active Problem List    Diagnosis Date Noted     Acute conjunctivitis 06/13/2006     Priority: Medium     Formatting of this note might be different from the original.  Epic         Allergic rhinitis 02/09/2005     Priority: Medium     Formatting of this note might be different from the original.  Allergic rhinitis due to other allergen         Migraine without aura 06/21/2004     Priority: Medium     Formatting of this note might be different from the original.  Epic          No past medical history on file.  No past surgical history on file.  Current Outpatient Medications   Medication Sig Dispense Refill     fluticasone (VERAMYST) 27.5 mcg/actuation nasal spray [FLUTICASONE (VERAMYST) 27.5 MCG/ACTUATION NASAL SPRAY] 2 sprays in each nostril once daily 10 g 12     losartan (COZAAR) 50 MG tablet [LOSARTAN (COZAAR) 50 MG TABLET] TAKE 1 PILL BY MOUTH DAILY FOR BLOOD PRESSURE / TXHUA HNUB NOJ 1 LUB TSHUAJ PAB ZOO TAMIR NTSHAV SIAB 90 tablet 3     multivitamin (ONE A DAY) per tablet [MULTIVITAMIN (ONE A DAY) PER TABLET] 1 cap(s)         Allergies   Allergen Reactions     Eggs-Apples-Oats [Fiber] Unknown     Mold [Molds & Smuts] Unknown     Pollens Extract [Pollen  "Extract] Unknown     Wheat [Wheat Bran] Unknown        Social History     Tobacco Use     Smoking status: Never Smoker     Smokeless tobacco: Never Used   Substance Use Topics     Alcohol use: Yes       History   Drug Use Unknown         Objective     /74 (BP Location: Right arm, Patient Position: Sitting, Cuff Size: Adult Regular)   Pulse 64   Resp 20   Ht 1.632 m (5' 4.25\")   Wt 75.7 kg (166 lb 14.4 oz)   BMI 28.43 kg/m      Physical Exam  General Appearance:    Alert, cooperative, no distress.   Head:    Normocephalic, without obvious abnormality.   Eyes:    PERRL, conjunctivae clear, EOM's intact        Ears:    Normal TM's and external ear canals, bilaterally   Nose:   septum midline, mucosa normal, no drainage    or sinus tenderness   Throat:   Lips, mucosa, and tongue normal; teeth and gums normal. No pharyngeal erythema or exudate.   Neck:   Supple, symmetrical, trachea midline, no adenopathy.  No thyroid enlargement/tenderness/nodules      Lungs:     Clear to auscultation bilaterally, respirations unlabored   Heart:    Regular rate and rhythm, S1 and S2 normal.   Abdomen:     Soft, non-tender, non-distended, bowel sounds present,     no masses, no organomegaly   Skin:   Skin color and texture normal, no rashes or lesions   Psych:   Affect pleasant, cooperative.    Neurologic:   Gait and speech are normal. Reflexes symmetric at the         patellae bilaterally.      Diagnostics:     Component      Latest Ref Rng & Units 7/28/2021   Sodium      136 - 145 mmol/L 141   Potassium      3.5 - 5.0 mmol/L 3.8   Chloride      98 - 107 mmol/L 105   Carbon Dioxide      22 - 31 mmol/L 28   Anion Gap      5 - 18 mmol/L 8   Urea Nitrogen      8 - 22 mg/dL 19   Creatinine      0.70 - 1.30 mg/dL 0.97   Calcium      8.5 - 10.5 mg/dL 8.9   Glucose      70 - 125 mg/dL 67 (L)   Alkaline Phosphatase      45 - 120 U/L 73   AST      0 - 40 U/L 28   ALT      0 - 45 U/L 34   Protein Total      6.0 - 8.0 g/dL 6.8 "   Albumin      3.5 - 5.0 g/dL 4.0   Bilirubin Total      0.0 - 1.0 mg/dL 1.1 (H)   GFR Estimate      >60 mL/min/1.73m2 87        Component      Latest Ref Rng & Units 8/2/2021   SARS CoV2 PCR      Negative Negative      No EKG required, no history of coronary heart disease, significant arrhythmia, peripheral arterial disease or other structural heart disease.    Revised Cardiac Risk Index (RCRI):  The patient has the following serious cardiovascular risks for perioperative complications:   - No serious cardiac risks = 0 points     RCRI Interpretation: 0 points: Class I (very low risk - 0.4% complication rate)    Signed Electronically by: Wilmar Siddiqi MD  Copy of this evaluation report is provided to requesting physician.

## 2021-07-28 NOTE — H&P (VIEW-ONLY)
[address] [address]  Phone: [phone]  Fax: [phone]  Primary Provider: Rubi Friedman  Pre-op Performing Provider: INDIA CASTELLANOS    PREOPERATIVE EVALUATION:  Today's date: 7/28/2021    Romina Rivera is a 56 year old male who presents for a preoperative evaluation.    Surgical Information:  Surgery/Procedure: Rectal Surgery for Hemorrhoids  Surgery Location: [facility]    Surgeon: Georgi  Surgery Date: 8/5/2021  Time of Surgery: 9:30am  Where patient plans to recover: At home with family  Fax number for surgical facility: Note does not need to be faxed, will be available electronically in Epic.    Type of Anesthesia Anticipated: General    Assessment & Plan     The proposed surgical procedure is considered INTERMEDIATE risk.    Romina was seen today for pre-op exam.    Diagnoses and all orders for this visit:    Preop general physical exam  -     Comprehensive metabolic panel (BMP + Alb, Alk Phos, ALT, AST, Total. Bili, TP); Future  -     Comprehensive metabolic panel (BMP + Alb, Alk Phos, ALT, AST, Total. Bili, TP)    External hemorrhoids    Need for diphtheria-tetanus-pertussis (Tdap) vaccine    Sleep-disordered breathing  -     SLEEP EVALUATION & MANAGEMENT REFERRAL - ADULT -; Future    Encounter for preoperative screening laboratory testing for COVID-19 virus  -     Asymptomatic COVID-19 Virus (Coronavirus) by PCR; Future    Other orders  -     TDAP VACCINE (Adacel, Boostrix)  [[id number]]      Medication Instructions:   - ACE/ARB: May be continued on the day of surgery.     RECOMMENDATION:  APPROVAL GIVEN to proceed with proposed procedure, without further diagnostic evaluation.     Subjective     HPI related to upcoming procedure: patient with persistent symptomatic hemorrhoids.     Preop Questions 7/28/2021   1. Have you ever had a heart attack or stroke? No   2. Have you ever had surgery on your heart or blood  vessels, such as a stent placement, a coronary artery bypass, or surgery on an artery in your head, neck, heart, or legs? No   3. Do you have chest pain with activity? No   4. Do you have a history of  heart failure? No   5. Do you currently have a cold, bronchitis or symptoms of other infection? No   6. Do you have a cough, shortness of breath, or wheezing? No   7. Do you or anyone in your family have previous history of blood clots? No   8. Do you or does anyone in your family have a serious bleeding problem such as prolonged bleeding following surgeries or cuts? No   9. Have you ever had problems with anemia or been told to take iron pills? No   10. Have you had any abnormal blood loss such as black, tarry or bloody stools? No   11. Have you ever had a blood transfusion? No   12. Are you willing to have a blood transfusion if it is medically needed before, during, or after your surgery? Yes   13. Have you or any of your relatives ever had problems with anesthesia? No   14. Do you have sleep apnea, excessive snoring or daytime drowsiness? UNKNOWN - SEE STOP-BANG QUESTIONNAIRE ABOVE.    14a. Do you have a CPAP machine? No   15. Do you have any artifical heart valves or other implanted medical devices like a pacemaker, defibrillator, or continuous glucose monitor? No   16. Do you have artificial joints? No   17. Are you allergic to latex? No       Health Care Directive:  Patient does not have a Health Care Directive or Living Will: Discussed advance care planning with patient; however, patient declined at this time.    Preoperative Review of :   reviewed - no record of controlled substances prescribed.     Review of Systems  CONSTITUTIONAL: NEGATIVE for fever, chills, change in weight  INTEGUMENTARY/SKIN: NEGATIVE for worrisome rashes, moles or lesions  EYES: NEGATIVE for vision changes or irritation  ENT/MOUTH: NEGATIVE for ear, mouth and throat problems  RESP: NEGATIVE for significant cough or SOB.  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  NO KNOWN HISTORY OF TUBERCULOSIS INFECTION.   CV: NEGATIVE for chest pain, palpitations or peripheral edema  GI: NEGATIVE for nausea, abdominal pain, heartburn, or change in bowel habits  : NEGATIVE for frequency, dysuria, or hematuria  MUSCULOSKELETAL: NEGATIVE for significant arthralgias or myalgia  NEURO: NEGATIVE for weakness, dizziness or paresthesias  ENDOCRINE: NEGATIVE for temperature intolerance, skin/hair changes  HEME: NEGATIVE for bleeding problems  PSYCHIATRIC: NEGATIVE for changes in mood or affect    Patient Active Problem List    Diagnosis Date Noted     Acute conjunctivitis 06/13/2006     Priority: Medium     Formatting of this note might be different from the original.  Epic         Allergic rhinitis 02/09/2005     Priority: Medium     Formatting of this note might be different from the original.  Allergic rhinitis due to other allergen         Migraine without aura 06/21/2004     Priority: Medium     Formatting of this note might be different from the original.  Epic          No past medical history on file.  No past surgical history on file.  Current Outpatient Medications   Medication Sig Dispense Refill     fluticasone (VERAMYST) 27.5 mcg/actuation nasal spray [FLUTICASONE (VERAMYST) 27.5 MCG/ACTUATION NASAL SPRAY] 2 sprays in each nostril once daily 10 g 12     losartan (COZAAR) 50 MG tablet [LOSARTAN (COZAAR) 50 MG TABLET] TAKE 1 PILL BY MOUTH DAILY FOR BLOOD PRESSURE / TXHUA HNUB NOJ 1 LUB TSHUAJ PAB ZOO TAMIR NTSHAV SIAB 90 tablet 3     multivitamin (ONE A DAY) per tablet [MULTIVITAMIN (ONE A DAY) PER TABLET] 1 cap(s)         Allergies   Allergen Reactions     Eggs-Apples-Oats [Fiber] Unknown     Mold [Molds & Smuts] Unknown     Pollens Extract [Pollen  "Extract] Unknown     Wheat [Wheat Bran] Unknown        Social History     Tobacco Use     Smoking status: Never Smoker     Smokeless tobacco: Never Used   Substance Use Topics     Alcohol use: Yes       History   Drug Use Unknown         Objective     /74 (BP Location: Right arm, Patient Position: Sitting, Cuff Size: Adult Regular)   Pulse 64   Resp 20   Ht 1.632 m (5' 4.25\")   Wt 75.7 kg (166 lb 14.4 oz)   BMI 28.43 kg/m      Physical Exam  General Appearance:    Alert, cooperative, no distress.   Head:    Normocephalic, without obvious abnormality.   Eyes:    PERRL, conjunctivae clear, EOM's intact        Ears:    Normal TM's and external ear canals, bilaterally   Nose:   septum midline, mucosa normal, no drainage    or sinus tenderness   Throat:   Lips, mucosa, and tongue normal; teeth and gums normal. No pharyngeal erythema or exudate.   Neck:   Supple, symmetrical, trachea midline, no adenopathy.  No thyroid enlargement/tenderness/nodules      Lungs:     Clear to auscultation bilaterally, respirations unlabored   Heart:    Regular rate and rhythm, S1 and S2 normal.   Abdomen:     Soft, non-tender, non-distended, bowel sounds present,     no masses, no organomegaly   Skin:   Skin color and texture normal, no rashes or lesions   Psych:   Affect pleasant, cooperative.    Neurologic:   Gait and speech are normal. Reflexes symmetric at the         patellae bilaterally.      Diagnostics:     Component      Latest Ref Rng & Units 7/28/2021   Sodium      136 - 145 mmol/L 141   Potassium      3.5 - 5.0 mmol/L 3.8   Chloride      98 - 107 mmol/L 105   Carbon Dioxide      22 - 31 mmol/L 28   Anion Gap      5 - 18 mmol/L 8   Urea Nitrogen      8 - 22 mg/dL 19   Creatinine      0.70 - 1.30 mg/dL 0.97   Calcium      8.5 - 10.5 mg/dL 8.9   Glucose      70 - 125 mg/dL 67 (L)   Alkaline Phosphatase      45 - 120 U/L 73   AST      0 - 40 U/L 28   ALT      0 - 45 U/L 34   Protein Total      6.0 - 8.0 g/dL 6.8 "   Albumin      3.5 - 5.0 g/dL 4.0   Bilirubin Total      0.0 - 1.0 mg/dL 1.1 (H)   GFR Estimate      >60 mL/min/1.73m2 87        Component      Latest Ref Rng & Units 8/2/2021   SARS CoV2 PCR      Negative Negative      No EKG required, no history of coronary heart disease, significant arrhythmia, peripheral arterial disease or other structural heart disease.    Revised Cardiac Risk Index (RCRI):  The patient has the following serious cardiovascular risks for perioperative complications:   - No serious cardiac risks = 0 points     RCRI Interpretation: 0 points: Class I (very low risk - 0.4% complication rate)    Signed Electronically by: Wilmar Siddiqi MD  Copy of this evaluation report is provided to requesting physician.

## 2021-07-28 NOTE — LETTER
July 29, 2021      Romina Rivera  [address] [address]        Dear ,    We are writing to inform you of your test results.    Please notify patient that his kidney function tests, and electrolytes were normal.  His blood sugar was slightly low, though not alarming.  We will reach out again once the TB test returns later in the week.         Resulted Orders   Comprehensive metabolic panel (BMP + Alb, Alk Phos, ALT, AST, Total. Bili, TP)   Result Value Ref Range    Sodium 141 136 - 145 mmol/L    Potassium 3.8 3.5 - 5.0 mmol/L    Chloride 105 98 - 107 mmol/L    Carbon Dioxide (CO2) 28 22 - 31 mmol/L    Anion Gap 8 5 - 18 mmol/L    Urea Nitrogen 19 8 - 22 mg/dL    Creatinine 0.97 0.70 - 1.30 mg/dL    Calcium 8.9 8.5 - 10.5 mg/dL    Glucose 67 (L) 70 - 125 mg/dL    Alkaline Phosphatase 73 45 - 120 U/L    AST 28 0 - 40 U/L    ALT 34 0 - 45 U/L    Protein Total 6.8 6.0 - 8.0 g/dL    Albumin 4.0 3.5 - 5.0 g/dL    Bilirubin Total 1.1 (H) 0.0 - 1.0 mg/dL    GFR Estimate 87 >60 mL/min/1.73m2      Comment:      As of July 11, 2021, eGFR is calculated by the CKD-EPI creatinine equation, without race adjustment. eGFR can be influenced by muscle mass, exercise, and diet. The reported eGFR is an estimation only and is only applicable if the renal function is stable.       If you have any questions or concerns, please call the clinic at the number listed above.       Sincerely,      Wilmar Siddiqi MD

## 2021-07-28 NOTE — PATIENT INSTRUCTIONS

## 2021-07-29 LAB
GAMMA INTERFERON BACKGROUND BLD IA-ACNC: 0.16 IU/ML
M TB IFN-G BLD-IMP: POSITIVE
M TB IFN-G CD4+ BCKGRND COR BLD-ACNC: 9.84 IU/ML
MITOGEN IGNF BCKGRD COR BLD-ACNC: 9.76 IU/ML
MITOGEN IGNF BCKGRD COR BLD-ACNC: 9.84 IU/ML
QUANTIFERON MITOGEN: 10 IU/ML
QUANTIFERON NIL TUBE: 0.16 IU/ML
QUANTIFERON TB1 TUBE: 10 IU/ML
QUANTIFERON TB2 TUBE: 9.92

## 2021-08-02 ENCOUNTER — TELEPHONE (OUTPATIENT)
Dept: FAMILY MEDICINE | Facility: CLINIC | Age: 56
End: 2021-08-02

## 2021-08-02 ENCOUNTER — NURSE TRIAGE (OUTPATIENT)
Dept: NURSING | Facility: CLINIC | Age: 56
End: 2021-08-02

## 2021-08-02 ENCOUNTER — LAB (OUTPATIENT)
Dept: FAMILY MEDICINE | Facility: CLINIC | Age: 56
End: 2021-08-02
Attending: FAMILY MEDICINE
Payer: COMMERCIAL

## 2021-08-02 DIAGNOSIS — Z11.52 ENCOUNTER FOR PREOPERATIVE SCREENING LABORATORY TESTING FOR COVID-19 VIRUS: ICD-10-CM

## 2021-08-02 DIAGNOSIS — Z01.812 ENCOUNTER FOR PREOPERATIVE SCREENING LABORATORY TESTING FOR COVID-19 VIRUS: ICD-10-CM

## 2021-08-02 PROCEDURE — U0003 INFECTIOUS AGENT DETECTION BY NUCLEIC ACID (DNA OR RNA); SEVERE ACUTE RESPIRATORY SYNDROME CORONAVIRUS 2 (SARS-COV-2) (CORONAVIRUS DISEASE [COVID-19]), AMPLIFIED PROBE TECHNIQUE, MAKING USE OF HIGH THROUGHPUT TECHNOLOGIES AS DESCRIBED BY CMS-2020-01-R: HCPCS

## 2021-08-02 PROCEDURE — U0005 INFEC AGEN DETEC AMPLI PROBE: HCPCS

## 2021-08-02 PROCEDURE — 99207 PR NO CHARGE LOS: CPT

## 2021-08-02 NOTE — LETTER
August 6, 2021      Romina Rivera  [address] [address]        Dear ,    We are writing to inform you of your test results.    Please make an appointment with your provider to review or follow up on your test results.  Appointments can be made by calling [phone].    Your screening test for pulmonary tuberculosis was positive.  If you are still not having any persistent cough, we recommend a follow up visit as soon as possible to further evaluate with chest x-ray and discuss whether treatment would be appropriate.  If you have started to experience a persistent cough please contact the clinic to advise on visit.       Resulted Orders   Comprehensive metabolic panel (BMP + Alb, Alk Phos, ALT, AST, Total. Bili, TP)   Result Value Ref Range    Sodium 141 136 - 145 mmol/L    Potassium 3.8 3.5 - 5.0 mmol/L    Chloride 105 98 - 107 mmol/L    Carbon Dioxide (CO2) 28 22 - 31 mmol/L    Anion Gap 8 5 - 18 mmol/L    Urea Nitrogen 19 8 - 22 mg/dL    Creatinine 0.97 0.70 - 1.30 mg/dL    Calcium 8.9 8.5 - 10.5 mg/dL    Glucose 67 (L) 70 - 125 mg/dL    Alkaline Phosphatase 73 45 - 120 U/L    AST 28 0 - 40 U/L    ALT 34 0 - 45 U/L    Protein Total 6.8 6.0 - 8.0 g/dL    Albumin 4.0 3.5 - 5.0 g/dL    Bilirubin Total 1.1 (H) 0.0 - 1.0 mg/dL    GFR Estimate 87 >60 mL/min/1.73m2      Comment:      As of July 11, 2021, eGFR is calculated by the CKD-EPI creatinine equation, without race adjustment. eGFR can be influenced by muscle mass, exercise, and diet. The reported eGFR is an estimation only and is only applicable if the renal function is stable.   Quantiferon TB Gold Plus Grey Tube   Result Value Ref Range    Quantiferon Nil Tube 0.16 IU/mL   Quantiferon TB Gold Plus Green Tube   Result Value Ref Range    Quantiferon TB1 Tube 10.00 IU/mL   Quantiferon TB Gold Plus Yellow Tube   Result Value Ref Range    Quantiferon TB2 Tube 9.92    Quantiferon TB Gold Plus Purple Tube   Result Value Ref Range    Quantiferon  Mitogen 10.00 IU/mL   Quantiferon TB Gold Plus   Result Value Ref Range    Quantiferon-TB Gold Plus Positive (A) Negative      Comment:      Interferon gamma response to M.tuberculosis antigens was detected,suggesting infection with M.tuberculosis. Positive results in patients at low risk for infection should be interpreted with caution and repeat testing on a new sample should be considered as recommended by the 2017 ATS/IDSA/CDC Clinical Prac  selene Guidelines for Diagnosis of Tuberculosis in Adults and Children     TB1 Ag minus Nil Value 9.84 IU/mL    TB2 Ag minus Nil Value 9.76 IU/mL    Mitogen minus Nil Result 9.84 IU/mL    Nil Result 0.16 IU/mL       If you have any questions or concerns, please call the clinic at the number listed above.       Sincerely,    Dr. Neeru VALE [facility]

## 2021-08-02 NOTE — TELEPHONE ENCOUNTER
Triage Call:  Patient is calling back as he was left a voicemail to call back due to his results.     ----- Message from Wilmar Siddiqi MD sent at 8/2/2021 11:29 AM CDT -----  Please phone patient.  His screening test for pulmonary tuberculosis was positive.  Presuming that he still is not having any persistent cough (he wasn't at the time of his visit last week), please recommend a follow-up visit with me this week to evaluate further with chest xray and to discuss whether treatment would be appropriate.      Patient denied cough and stated he was getting tested due to his work needing results prior to working. Patient was connected with scheduling to make an appointment.     COVID 19 Nurse Triage Plan/Patient Instructions    Please be aware that novel coronavirus (COVID-19) may be circulating in the community. If you develop symptoms such as fever, cough, or SOB or if you have concerns about the presence of another infection including coronavirus (COVID-19), please contact your health care provider or visit [email or web address].     Disposition/Instructions    Virtual Visit with provider recommended. Reference Visit Selection Guide.  In-Person Visit with provider recommended. Reference Visit Selection Guide.    Thank you for taking steps to prevent the spread of this virus.  o Limit your contact with others.  o Wear a simple mask to cover your cough.  o Wash your hands well and often.    Resources    [facility]: About COVID-19: [email or web address]    CDC: What to Do If You're Sick: www.cdc.gov/coronavirus/2019-ncov/about/steps-when-sick.html    CDC: Ending Home Isolation: www.cdc.gov/coronavirus/2019-ncov/hcp/disposition-in-home-patients.html     CDC: Caring for Someone: www.cdc.gov/coronavirus/2019-ncov/if-you-are-sick/care-for-someone.html     [facility]: Interim Guidance for Hospital Discharge to Home: www.health.[email or web address].[place].us/diseases/coronavirus/hcp/hospdischarge.pdf    [facility]  [place] clinical trials (COVID-19 research studies): [email or web address]-clinical-trials     Below are the COVID-19 hotlines at the [facility] of Health ([facility]). Interpreters are available.   o For health questions: Call 854-874-9199 or 1-268.683.6842 (7 a.m. to 7 p.m.)  o For questions about schools and childcare: Call 404-784-3167 or 1-742.130.7304 (7 a.m. to 7 p.m.)     Krystyna Kee RN Nursing Advisor 8/2/2021 5:52 PM     Reason for Disposition    Caller requesting lab results    Protocols used: PCP CALL - NO TRIAGE-A-

## 2021-08-02 NOTE — TELEPHONE ENCOUNTER
Left message to call clinic, trying to get his results to him and schedule a follow up with Dr. Siddiqi.

## 2021-08-02 NOTE — TELEPHONE ENCOUNTER
----- Message from Wilmar Siddiqi MD sent at 8/2/2021 11:29 AM CDT -----  Please phone patient.  His screening test for pulmonary tuberculosis was positive.  Presuming that he still is not having any persistent cough (he wasn't at the time of his visit last week), please recommend a follow-up visit with me this week to evaluate further with chest xray and to discuss whether treatment would be appropriate.    Thank you,  Wilmar Siddiqi MD

## 2021-08-03 LAB — SARS-COV-2 RNA RESP QL NAA+PROBE: NEGATIVE

## 2021-08-04 ENCOUNTER — TELEPHONE (OUTPATIENT)
Dept: FAMILY MEDICINE | Facility: CLINIC | Age: 56
End: 2021-08-04

## 2021-08-04 ENCOUNTER — ANESTHESIA EVENT (OUTPATIENT)
Dept: SURGERY | Facility: AMBULATORY SURGERY CENTER | Age: 56
End: 2021-08-04
Payer: COMMERCIAL

## 2021-08-04 ASSESSMENT — MIFFLIN-ST. JEOR: SCORE: 1488.87

## 2021-08-04 NOTE — TELEPHONE ENCOUNTER
Dr. Neeru Mckenzie, RN from the [facility] is calling asking if you could complete the Pre-op note/visit from 7/28. This patient has surgery tomorrow morning (8/5) with Dr. Laureano. She can be reached at [phone] with any questions.

## 2021-08-05 ENCOUNTER — ANESTHESIA (OUTPATIENT)
Dept: SURGERY | Facility: AMBULATORY SURGERY CENTER | Age: 56
End: 2021-08-05
Payer: COMMERCIAL

## 2021-08-05 ENCOUNTER — HOSPITAL ENCOUNTER (OUTPATIENT)
Facility: AMBULATORY SURGERY CENTER | Age: 56
End: 2021-08-05
Attending: COLON & RECTAL SURGERY
Payer: COMMERCIAL

## 2021-08-05 VITALS
OXYGEN SATURATION: 100 % | BODY MASS INDEX: 28 KG/M2 | HEART RATE: 89 BPM | TEMPERATURE: 97 F | DIASTOLIC BLOOD PRESSURE: 70 MMHG | WEIGHT: 164 LBS | HEIGHT: 64 IN | SYSTOLIC BLOOD PRESSURE: 144 MMHG | RESPIRATION RATE: 16 BRPM

## 2021-08-05 DIAGNOSIS — K64.8 HEMORRHOID PROLAPSE: ICD-10-CM

## 2021-08-05 RX ORDER — LIDOCAINE 40 MG/G
CREAM TOPICAL
Status: DISCONTINUED | OUTPATIENT
Start: 2021-08-05 | End: 2021-08-06 | Stop reason: HOSPADM

## 2021-08-05 RX ORDER — GLYCOPYRROLATE 0.2 MG/ML
INJECTION, SOLUTION INTRAMUSCULAR; INTRAVENOUS PRN
Status: DISCONTINUED | OUTPATIENT
Start: 2021-08-05 | End: 2021-08-05

## 2021-08-05 RX ORDER — BUPIVACAINE HYDROCHLORIDE AND EPINEPHRINE 5; 5 MG/ML; UG/ML
INJECTION, SOLUTION PERINEURAL PRN
Status: DISCONTINUED | OUTPATIENT
Start: 2021-08-05 | End: 2021-08-05 | Stop reason: HOSPADM

## 2021-08-05 RX ORDER — FENTANYL CITRATE 50 UG/ML
25 INJECTION, SOLUTION INTRAMUSCULAR; INTRAVENOUS EVERY 5 MIN PRN
Status: SHIPPED | OUTPATIENT
Start: 2021-08-05 | End: 2021-08-05

## 2021-08-05 RX ORDER — LIDOCAINE HYDROCHLORIDE 20 MG/ML
INJECTION, SOLUTION INFILTRATION; PERINEURAL PRN
Status: DISCONTINUED | OUTPATIENT
Start: 2021-08-05 | End: 2021-08-05

## 2021-08-05 RX ORDER — OXYCODONE HYDROCHLORIDE 5 MG/1
5-10 TABLET ORAL
Qty: 40 TABLET | Refills: 0 | Status: SHIPPED | OUTPATIENT
Start: 2021-08-05 | End: 2021-08-08

## 2021-08-05 RX ORDER — ONDANSETRON 4 MG/1
4 TABLET, ORALLY DISINTEGRATING ORAL EVERY 30 MIN PRN
Status: DISCONTINUED | OUTPATIENT
Start: 2021-08-05 | End: 2021-08-06 | Stop reason: HOSPADM

## 2021-08-05 RX ORDER — MEPERIDINE HYDROCHLORIDE 25 MG/ML
12.5 INJECTION INTRAMUSCULAR; INTRAVENOUS; SUBCUTANEOUS
Status: DISCONTINUED | OUTPATIENT
Start: 2021-08-05 | End: 2021-08-06 | Stop reason: HOSPADM

## 2021-08-05 RX ORDER — ONDANSETRON 2 MG/ML
INJECTION INTRAMUSCULAR; INTRAVENOUS PRN
Status: DISCONTINUED | OUTPATIENT
Start: 2021-08-05 | End: 2021-08-05

## 2021-08-05 RX ORDER — ONDANSETRON 4 MG/1
4 TABLET, ORALLY DISINTEGRATING ORAL
Status: DISCONTINUED | OUTPATIENT
Start: 2021-08-05 | End: 2021-08-06 | Stop reason: HOSPADM

## 2021-08-05 RX ORDER — PROPOFOL 10 MG/ML
INJECTION, EMULSION INTRAVENOUS PRN
Status: DISCONTINUED | OUTPATIENT
Start: 2021-08-05 | End: 2021-08-05

## 2021-08-05 RX ORDER — SODIUM CHLORIDE, SODIUM LACTATE, POTASSIUM CHLORIDE, CALCIUM CHLORIDE 600; 310; 30; 20 MG/100ML; MG/100ML; MG/100ML; MG/100ML
INJECTION, SOLUTION INTRAVENOUS CONTINUOUS
Status: DISCONTINUED | OUTPATIENT
Start: 2021-08-05 | End: 2021-08-06 | Stop reason: HOSPADM

## 2021-08-05 RX ORDER — KETOROLAC TROMETHAMINE 30 MG/ML
INJECTION, SOLUTION INTRAMUSCULAR; INTRAVENOUS PRN
Status: DISCONTINUED | OUTPATIENT
Start: 2021-08-05 | End: 2021-08-05

## 2021-08-05 RX ORDER — DEXAMETHASONE SODIUM PHOSPHATE 4 MG/ML
INJECTION, SOLUTION INTRA-ARTICULAR; INTRALESIONAL; INTRAMUSCULAR; INTRAVENOUS; SOFT TISSUE PRN
Status: DISCONTINUED | OUTPATIENT
Start: 2021-08-05 | End: 2021-08-05

## 2021-08-05 RX ORDER — DIAZEPAM 10 MG
10 TABLET ORAL 4 TIMES DAILY
Qty: 12 TABLET | Refills: 0 | Status: SHIPPED | OUTPATIENT
Start: 2021-08-05

## 2021-08-05 RX ORDER — OXYCODONE HYDROCHLORIDE 5 MG/1
5 TABLET ORAL EVERY 4 HOURS PRN
Status: DISCONTINUED | OUTPATIENT
Start: 2021-08-05 | End: 2021-08-06 | Stop reason: HOSPADM

## 2021-08-05 RX ORDER — OXYCODONE HYDROCHLORIDE 5 MG/1
5 TABLET ORAL
Status: COMPLETED | OUTPATIENT
Start: 2021-08-05 | End: 2021-08-05

## 2021-08-05 RX ORDER — FENTANYL CITRATE 50 UG/ML
INJECTION, SOLUTION INTRAMUSCULAR; INTRAVENOUS PRN
Status: DISCONTINUED | OUTPATIENT
Start: 2021-08-05 | End: 2021-08-05

## 2021-08-05 RX ORDER — HYDROMORPHONE HCL IN WATER/PF 6 MG/30 ML
0.2 PATIENT CONTROLLED ANALGESIA SYRINGE INTRAVENOUS EVERY 5 MIN PRN
Status: DISCONTINUED | OUTPATIENT
Start: 2021-08-05 | End: 2021-08-06 | Stop reason: HOSPADM

## 2021-08-05 RX ORDER — ONDANSETRON 2 MG/ML
4 INJECTION INTRAMUSCULAR; INTRAVENOUS EVERY 30 MIN PRN
Status: DISCONTINUED | OUTPATIENT
Start: 2021-08-05 | End: 2021-08-06 | Stop reason: HOSPADM

## 2021-08-05 RX ADMIN — PROPOFOL 150 MG: 10 INJECTION, EMULSION INTRAVENOUS at 09:46

## 2021-08-05 RX ADMIN — LIDOCAINE HYDROCHLORIDE 60 MG: 20 INJECTION, SOLUTION INFILTRATION; PERINEURAL at 09:46

## 2021-08-05 RX ADMIN — DEXAMETHASONE SODIUM PHOSPHATE 10 MG: 4 INJECTION, SOLUTION INTRA-ARTICULAR; INTRALESIONAL; INTRAMUSCULAR; INTRAVENOUS; SOFT TISSUE at 10:00

## 2021-08-05 RX ADMIN — KETOROLAC TROMETHAMINE 15 MG: 30 INJECTION, SOLUTION INTRAMUSCULAR; INTRAVENOUS at 10:09

## 2021-08-05 RX ADMIN — SODIUM CHLORIDE, SODIUM LACTATE, POTASSIUM CHLORIDE, CALCIUM CHLORIDE: 600; 310; 30; 20 INJECTION, SOLUTION INTRAVENOUS at 09:14

## 2021-08-05 RX ADMIN — FENTANYL CITRATE 100 MCG: 50 INJECTION, SOLUTION INTRAMUSCULAR; INTRAVENOUS at 09:46

## 2021-08-05 RX ADMIN — OXYCODONE HYDROCHLORIDE 5 MG: 5 TABLET ORAL at 11:09

## 2021-08-05 RX ADMIN — ONDANSETRON 4 MG: 2 INJECTION INTRAMUSCULAR; INTRAVENOUS at 10:00

## 2021-08-05 RX ADMIN — FENTANYL CITRATE 25 MCG: 50 INJECTION, SOLUTION INTRAMUSCULAR; INTRAVENOUS at 10:37

## 2021-08-05 RX ADMIN — FENTANYL CITRATE 25 MCG: 50 INJECTION, SOLUTION INTRAMUSCULAR; INTRAVENOUS at 10:42

## 2021-08-05 RX ADMIN — GLYCOPYRROLATE 0.2 MG: 0.2 INJECTION, SOLUTION INTRAMUSCULAR; INTRAVENOUS at 09:46

## 2021-08-05 ASSESSMENT — MIFFLIN-ST. JEOR: SCORE: 1488.87

## 2021-08-05 NOTE — ANESTHESIA CARE TRANSFER NOTE
Patient: Tswv F Nicole    Procedure(s):  EXAM UNDER ANESTHESIA WITH HEMORRHOIDECTOMY    Diagnosis: Hemorrhoid prolapse [K64.8]  Diagnosis Additional Information: No value filed.    Anesthesia Type:   General     Note:    Oropharynx: oropharynx clear of all foreign objects  Level of Consciousness: drowsy  Oxygen Supplementation: face mask  Level of Supplemental Oxygen (L/min / FiO2): 10  Independent Airway: airway patency satisfactory and stable  Dentition: dentition unchanged  Vital Signs Stable: post-procedure vital signs reviewed and stable  Report to RN Given: handoff report given  Patient transferred to: PACU    Handoff Report: Identifed the Patient, Identified the Reponsible Provider, Reviewed the pertinent medical history, Discussed the surgical course, Reviewed Intra-OP anesthesia mangement and issues during anesthesia, Set expectations for post-procedure period and Allowed opportunity for questions and acknowledgement of understanding      Vitals:  Vitals Value Taken Time   /80 08/05/21 1022   Temp 97.5  F (36.4  C) 08/05/21 1022   Pulse     Resp 18 08/05/21 1022   SpO2 100 % 08/05/21 1022       Electronically Signed By: AUDELIA Ulrich CRNA  August 5, 2021  10:24 AM

## 2021-08-05 NOTE — DISCHARGE INSTRUCTIONS
You received a medication called Toradol (a stronger IV ibuprofen) at 10:09 AM. Do NOT take any Ibuprofen / Advil / Aleve / Naproxen or products containing Ibuprofen until 4:09 PM or later.  If you have any questions or concerns regarding your procedure, please contact Dr. Laureano, their office number is [phone].  Discharge Instructions for Hemorrhoid Surgery   You had surgery to remove hemorrhoids. These are large, swollen veins inside and outside the anus. Hemorrhoids are caused by too much pressure on the anus. This is often due to straining during bowel movements or pressure during pregnancy. After surgery, it may take a few weeks or longer to recover.  Home care  You may have some bleeding, discharge, or itching for a short time after surgery. This is common. Once at home, be sure to:    Take prescribed pain medicines on time as directed. Don t skip doses or wait until pain gets bad, as it may be harder to control.    Take sitz baths. Fill a tub with 3 inches of warm water. Sit in the basin or tub for 10 to 20 minutes a few times a day and after each bowel movement.    Avoid straining to pass stool. This can increase pressure on the anus. It can also lead to swelling.    Prevent constipation:  ? Use a laxative or stool softener as advised.  ? Eat more high-fiber foods. These include whole grains, fruit, and veggies.  ? Drink plenty of fluids.    Don't lift anything heavy or do strenuous activity for 1 to 2 weeks.    Use suppositories and pads, if needed. These can help relieve symptoms.    Don't drive until you re able to sit and move without pain. Ask someone to drive you to appointments, if needed.    Practice good bowel habits. Don t ignore the urge to go. Don't spend too much time on the toilet.  Follow-up  Follow-up with your healthcare provider, or as advised. The healthcare provider will check how well you re healing. This visit will likely happen within 1 to 2 weeks.  When to call your healthcare  provider  Call your healthcare provider right away if you have any of the following:    Fever of 101.5 F or higher, or as directed by your healthcare provider    Chills    A large amount of drainage or bleeding from the rectum    Trouble urinating    No bowel movement for more than 48 hours    Discharge Instructions: After Your Surgery  You ve just had surgery. During surgery, you were given medicine called anesthesia to keep you relaxed and free of pain. After surgery, you may have some pain or nausea. This is common. Here are some tips for feeling better and getting well after surgery.  Going home  Your healthcare provider will show you how to take care of yourself when you go home. He or she will also answer your questions. Have an adult family member or friend drive you home. For the first 24 hours after your surgery:    Don't drive or use heavy equipment.    Don't make important decisions or sign legal papers.    Don't drink alcohol.    Have someone stay with you. He or she can watch for problems and help keep you safe.  Be sure to go to all follow-up visits with your healthcare provider. And rest after your surgery for as long as your healthcare provider tells you to.  Coping with pain  If you have pain after surgery, pain medicine will help you feel better. Take it as told, before pain becomes severe. Also, ask your healthcare provider or pharmacist about other ways to control pain. This might be with heat, ice, or relaxation. And follow any other instructions your surgeon or nurse gives you.  Tips for taking pain medicine  To get the best relief possible, remember these points:    Pain medicines can upset your stomach. Taking them with a little food may help.    Most pain relievers taken by mouth need at least 20 to 30 minutes to start to work.    Don't wait till your pain becomes severe before you take your medicine. Try to time your medicine so that you can take it before starting an activity. This might  be before you get dressed, go for a walk, or sit down for dinner.    Constipation is a common side effect of pain medicines. You may take laxatives or stool softeners to help ease constipation.  Drinking lots of fluids and eating foods such as fruits and vegetables that are high in fiber can also help.     Drinking alcohol and taking pain medicine can cause dizziness and slow your breathing. It can even be deadly. Don't drink alcohol while taking pain medicine.    Pain medicine can make you react more slowly to things. Don't drive or run machinery while taking pain medicine.  Your healthcare provider may tell you to take acetaminophen to help ease your pain. Ask him or her how much you are supposed to take each day. Acetaminophen or other pain relievers may interact with your prescription medicines or other over-the-counter (OTC) medicines. Some prescription medicines have acetaminophen and other ingredients. Using both prescription and OTC acetaminophen for pain can cause you to overdose. Read the labels on your OTC medicines with care. This will help you to clearly know the list of ingredients, how much to take, and any warnings. It may also help you not take too much acetaminophen. If you have questions or don't understand the information, ask your pharmacist or healthcare provider to explain it to you before you take the OTC medicine.  Managing nausea  Some people have an upset stomach after surgery. This is often because of anesthesia, pain, or pain medicine, or the stress of surgery. These tips will help you handle nausea and eat healthy foods as you get better. If you were on a special food plan before surgery, ask your healthcare provider if you should follow it while you get better. These tips may help:    Don't push yourself to eat. Your body will tell you when to eat and how much.    Start off with clear liquids and soup. They are easier to digest.    Next try semi-solid foods, such as mashed potatoes,  applesauce, and gelatin, as you feel ready.    Slowly move to solid foods. Don t eat fatty, rich, or spicy foods at first.    Don't force yourself to have 3 large meals a day. Instead eat smaller amounts more often.    Take pain medicines with a small amount of solid food, such as crackers or toast, to prevent nausea.    When to call your healthcare provider  Call your healthcare provider if:    You still have intolerable pain an hour after taking medicine. The medicine may not be strong enough.    You feel too sleepy, dizzy, or groggy. The medicine may be too strong.    You have side effects such as nausea or vomiting, or skin changes such as rash, itching, or hives. Your healthcare provider may suggest other medicines to control side effects.  Rash, itching, or hives may mean you have an allergic reaction. Report this right away. If you have trouble breathing or facial swelling, call 911 right away.  If you have obstructive sleep apnea  You were given anesthesia medicine during surgery to keep you comfortable and free of pain. After surgery, you may have more apnea spells because of this medicine and other medicines you were given. The spells may last longer than usual.   At home:    Keep using the continuous positive airway pressure (CPAP) device when you sleep. Unless your healthcare provider tells you not to, use it when you sleep, day or night. CPAP is a common device used to treat obstructive sleep apnea.    Talk with your provider before taking any pain medicine, muscle relaxants, or sedatives. Your provider will tell you about the possible dangers of taking these medicines.

## 2021-08-05 NOTE — ANESTHESIA POSTPROCEDURE EVALUATION
Patient: Tswv F Nicole    Procedure(s):  EXAM UNDER ANESTHESIA WITH HEMORRHOIDECTOMY    Diagnosis:Hemorrhoid prolapse [K64.8]  Diagnosis Additional Information: No value filed.    Anesthesia Type:  General    Note:  Disposition: Outpatient   Postop Pain Control: Uneventful            Sign Out: Well controlled pain   PONV: No   Neuro/Psych: Uneventful            Sign Out: Acceptable/Baseline neuro status   Airway/Respiratory: Uneventful            Sign Out: Acceptable/Baseline resp. status   CV/Hemodynamics: Uneventful            Sign Out: Acceptable CV status; No obvious hypovolemia; No obvious fluid overload   Other NRE: NONE   DID A NON-ROUTINE EVENT OCCUR? No           Last vitals:  Vitals Value Taken Time   /80 08/05/21 1030   Temp 97.5  F (36.4  C) 08/05/21 1022   Pulse 89 08/05/21 1036   Resp 16 08/05/21 1030   SpO2 99 % 08/05/21 1036   Vitals shown include unvalidated device data.    Electronically Signed By: Nancy Umanzor MD  August 5, 2021  10:40 AM

## 2021-08-05 NOTE — BRIEF OP NOTE
[facility]    Brief Operative Note    Pre-operative diagnosis: Hemorrhoid prolapse [K64.8]  Post-operative diagnosis Same as pre-operative diagnosis    Procedure: Procedure(s):  EXAM UNDER ANESTHESIA WITH HEMORRHOIDECTOMY  Surgeon: Surgeon(s) and Role:     * Manish Laureano MD - Primary  Anesthesia: General   Estimated blood loss: Minimal  Drains: None  Specimens:   ID Type Source Tests Collected by Time Destination   1 :  Tissue Hemorrhoids SURGICAL PATHOLOGY EXAM Manish Laureano MD 8/5/2021 10:08 AM      Findings:   Two prolapsing quadrants excised, one fulgurated.  Complications: None.  Implants: * No implants in log *

## 2021-08-05 NOTE — ANESTHESIA PROCEDURE NOTES
Airway       Patient location during procedure: OR       Procedure Start/Stop Times: 8/5/2021 9:57 AM  Staff -        Performed By: CRNA  Consent for Airway        Urgency: elective  Indications and Patient Condition       Indications for airway management: denny-procedural       Induction type:intravenous       Mask difficulty assessment: 2 - vent by mask + OA or adjuvant +/- NMBA    Final Airway Details       Final airway type: endotracheal airway       Successful airway: ETT - single  Endotracheal Airway Details        ETT size (mm): 8.0       Cuffed: yes       Cuff volume (mL): 8       Successful intubation technique: video laryngoscopy       VL Blade Size: Glidescope 4       Grade View of Cords: 1       Adjucts: stylet and tooth guard       Position: Center       Measured from: gums/teeth       Secured at (cm): 22    Post intubation assessment        Placement verified by: capnometry, equal breath sounds and chest rise        Number of attempts at approach: 2       Number of other approaches attempted: 1       Secured with: silk tape       Ease of procedure: easy       Dentition: Intact and Unchanged    Additional Comments       DL X 1 mayorga 2.  Unable to visualize cords.  Glidescope 4 no issue.

## 2021-08-05 NOTE — ANESTHESIA PREPROCEDURE EVALUATION
Anesthesia Pre-Procedure Evaluation    Patient: Romina Rivera   MRN: [id number] : 1965        Preoperative Diagnosis: Hemorrhoid prolapse [K64.8]   Procedure : Procedure(s):  EXAM UNDER ANESTHESIA WITH HEMORRHOIDECTOMY     Past Medical History:   Diagnosis Date     Hypertension      Uncomplicated asthma     Seasonal      History reviewed. No pertinent surgical history.   Allergies   Allergen Reactions     Dust Mites      Eggs-Apples-Oats [Fiber] Unknown     Mold [Molds & Smuts] Unknown     Pollens Extract [Pollen Extract] Unknown     Wheat [Wheat Bran] Unknown      Social History     Tobacco Use     Smoking status: Never Smoker     Smokeless tobacco: Never Used   Substance Use Topics     Alcohol use: Yes      Wt Readings from Last 1 Encounters:   21 74.4 kg (164 lb)        Anesthesia Evaluation   Pt has not had prior anesthetic         ROS/MED HX  ENT/Pulmonary:  - neg pulmonary ROS     Neurologic:     (+) migraines,     Cardiovascular:     (+) hypertension----- (-) SHAH   METS/Exercise Tolerance: >4 METS    Hematologic:  - neg hematologic  ROS     Musculoskeletal:  - neg musculoskeletal ROS     GI/Hepatic: Comment: Hemorrhoid      Renal/Genitourinary:  - neg Renal ROS     Endo:  - neg endo ROS     Psychiatric/Substance Use:  - neg psychiatric ROS     Infectious Disease:  - neg infectious disease ROS     Malignancy:  - neg malignancy ROS     Other:            Physical Exam    Airway        Mallampati: II   TM distance: > 3 FB   Neck ROM: full   Mouth opening: > 3 cm    Respiratory Devices and Support         Dental  no notable dental history         Cardiovascular   cardiovascular exam normal       Rhythm and rate: regular and normal     Pulmonary   pulmonary exam normal        breath sounds clear to auscultation           OUTSIDE LABS:  CBC:   Lab Results   Component Value Date    WBC 5.6 10/15/2019    HGB 15.5 10/15/2019    HCT 44.6 10/15/2019     10/15/2019     BMP:   Lab Results   Component Value  Date     07/28/2021     10/15/2019    POTASSIUM 3.8 07/28/2021    POTASSIUM 4.0 10/15/2019    CHLORIDE 105 07/28/2021    CHLORIDE 103 10/15/2019    CO2 28 07/28/2021    CO2 26 10/15/2019    BUN 19 07/28/2021    BUN 15 10/15/2019    CR 0.97 07/28/2021    CR 0.97 10/15/2019    GLC 67 (L) 07/28/2021    GLC 93 10/15/2019     COAGS: No results found for: PTT, INR, FIBR  POC: No results found for: BGM, HCG, HCGS  HEPATIC:   Lab Results   Component Value Date    ALBUMIN 4.0 07/28/2021    PROTTOTAL 6.8 07/28/2021    ALT 34 07/28/2021    AST 28 07/28/2021    ALKPHOS 73 07/28/2021    BILITOTAL 1.1 (H) 07/28/2021     OTHER:   Lab Results   Component Value Date    COLLETTE 8.9 07/28/2021    TSH 2.44 10/15/2019       Anesthesia Plan    ASA Status:  2      Anesthesia Type: General.              Consents         - Extended Intubation/Ventilatory Support Discussed: No.      - Patient is DNR/DNI Status: No    Use of blood products discussed: No .     Postoperative Care    Pain management: IV analgesics, Oral pain medications.   PONV prophylaxis: Ondansetron (or other 5HT-3), Dexamethasone or Solumedrol     Comments:                Nancy Umanzor MD

## 2021-08-05 NOTE — OP NOTE
Procedure Date: 2021    PREOPERATIVE DIAGNOSIS:  Prolapsing hemorrhoids.    POSTOPERATIVE DIAGNOSIS:  Prolapsing hemorrhoids.    PROCEDURE PERFORMED:  Two-quadrant LigaSure hemorrhoidectomy with fulguration of remaining hemorrhoid.    SURGEON:  Manish Laureano MD    ANESTHESIA:  General.    INDICATIONS FOR PROCEDURE:  The patient is a gentleman with prolapsing hemorrhoids, requiring manual reduction every day.  He is not amenable to rubber band ligation and presents for operative hemorrhoidectomy.  Risks, benefits, expected outcome discussed including 15% chance of urinary retention.  He acknowledged this and is eager to proceed.    DESCRIPTION OF PROCEDURE:  After the uneventful induction of general endotracheal anesthesia, the patient was placed comfortably in jackknife prone position.  Care was taken to cushion all points of undue compression.  Buttocks were retracted adhesive tape.  Perineum prepped and draped in the usual sterile fashion.    15 mL of 0.5% Marcaine with epinephrine was instilled in the perineal tissues for postop pain relief.  Next, a well lubricated Stahl bivalve anoscope inserted and exam performed.  The right anterolateral and right posterolateral quadrants were enlarged and these were treated with the laser excised with the LigaSure [place] device.  The left lateral quadrant was smaller and simply treated with fulguration with electrocautery.  At the completion of the procedure, there was adequate anal canal lumen, hemostasis was complete and no further rectal mucosal prolapse could be elicited upon withdrawing the sponge from the anal canal.  Sterile dressing was applied.  The patient tolerated the procedure well, was taken to recovery in good condition.    ESTIMATED BLOOD LOSS:  1 mL    COUNTS:  None.    Manish Laureano MD        D: 2021   T: 2021   MT: sd    Name:     BRE STOLL  MRN:      [id number]        Account:        [id number]   :      1965            Procedure Date: 08/05/2021     Document: [id number]

## 2021-08-06 NOTE — PROGRESS NOTES
Assessment & Plan     Postoperative infection, unspecified type, initial encounter  Rectal bleeding  On exam, no obvious cellulitis, excessive bleeding or purulent drainage of the superficial site of surgery. Unable to visual the areas of excision within the rectum. VSS and afebrile in the office today. Patient doesn't appear acutely infected but I am unable to see past the entrance of the rectum.  Is reporting a Tmax of 100.5 and excessive bleeding from the surgical site. Will get CBC to r/o acute blood loss anemia and check WBC count. At this time, I don't think he needs antibiotics. Would like him to go see surgical team for further guidance. Nursing was able to get him an appt with surgical team later today. Patient made aware and he will make his way there.   - CBC with platelets    Positive QuantiFERON-TB Gold test  Given positive Quantiferon, will have patient do a CXR. Is asx from a respiratory standpoint. Next steps will be based on CXR.   - XR Chest 2 Views    49 minutes spent on the date of the encounter doing chart review, history and exam, documentation and further activities per the note      Return if symptoms worsen or fail to improve.    Mara Craig DO  [facility]    Subjective   Tswv is a 56 year old who presents for the following health issues.    HPI     Patient was recently found to have positive QuantiFERON.  Was told to follow-up PCPs office to discuss further steps and medication options.    Patient reports that he was told that he had latent TB when he was a refugee in [facility].  Was put on antibiotics for latent TB x1 year and levels.  Does not have any respiratory issues, cough, hemoptysis or weight loss.  Is planning to be a .  Has never gotten the BCG vaccine, to his knowledge.    His more pressing issue that he would like to discuss today, is his recent surgery.  Had surgery for hemorrhoid prolapse on 8/5/2021.  Says that he was doing well  Thursday and Friday.  However, on Saturday, spiked a fever to 100.5 and then has been experiencing low-grade fevers since then.  Is in significant pain that is being somewhat controlled with narcotics and Valium.  He also feels that he has had increased bleeding in the area.  Is supposed to be doing twice daily dressing changes but did not change it last night.  This morning, when he woke up the gauze was soaked through and his underwear.  Bowel movements have been very painful.  Has been having a very difficult time sitting and standing.      Review of Systems   Constitutional, HEENT, cardiovascular, pulmonary, gi and gu systems are negative, except as otherwise noted.      Objective    BP 98/58   Temp 99.2  F (37.3  C) (Tympanic)   Wt 76.2 kg (168 lb)   BMI 28.61 kg/m    Body mass index is 28.61 kg/m .     Physical Exam   GENERAL: healthy, alert and no distress. Seems to be in pain moving from sitting to standing.   RECTAL (male): (+) superficial, perii-anal healing surgical site without any cellulitis, induration, bleeding or purulent drainage noted. 2 other surgical incision sites identified at the entrance of rectum without evidence of bleeding  or infection. Unable to see/neot any additional surgical sites.

## 2021-08-09 ENCOUNTER — OFFICE VISIT (OUTPATIENT)
Dept: FAMILY MEDICINE | Facility: CLINIC | Age: 56
End: 2021-08-09
Payer: COMMERCIAL

## 2021-08-09 VITALS
BODY MASS INDEX: 28.61 KG/M2 | DIASTOLIC BLOOD PRESSURE: 58 MMHG | SYSTOLIC BLOOD PRESSURE: 98 MMHG | WEIGHT: 168 LBS | TEMPERATURE: 99.2 F

## 2021-08-09 DIAGNOSIS — T81.40XA POSTOPERATIVE INFECTION, UNSPECIFIED TYPE, INITIAL ENCOUNTER: ICD-10-CM

## 2021-08-09 DIAGNOSIS — K62.5 RECTAL BLEEDING: Primary | ICD-10-CM

## 2021-08-09 DIAGNOSIS — R76.12 POSITIVE QUANTIFERON-TB GOLD TEST: ICD-10-CM

## 2021-08-09 LAB
ERYTHROCYTE [DISTWIDTH] IN BLOOD BY AUTOMATED COUNT: 12.5 % (ref 10–15)
HCT VFR BLD AUTO: 42.6 % (ref 40–53)
HGB BLD-MCNC: 14.9 G/DL (ref 13.3–17.7)
MCH RBC QN AUTO: 30.7 PG (ref 26.5–33)
MCHC RBC AUTO-ENTMCNC: 35 G/DL (ref 31.5–36.5)
MCV RBC AUTO: 88 FL (ref 78–100)
PLATELET # BLD AUTO: 124 10E3/UL (ref 150–450)
RBC # BLD AUTO: 4.86 10E6/UL (ref 4.4–5.9)
WBC # BLD AUTO: 8 10E3/UL (ref 4–11)

## 2021-08-09 PROCEDURE — 85027 COMPLETE CBC AUTOMATED: CPT | Performed by: STUDENT IN AN ORGANIZED HEALTH CARE EDUCATION/TRAINING PROGRAM

## 2021-08-09 PROCEDURE — 99215 OFFICE O/P EST HI 40 MIN: CPT | Performed by: STUDENT IN AN ORGANIZED HEALTH CARE EDUCATION/TRAINING PROGRAM

## 2021-08-09 PROCEDURE — 36415 COLL VENOUS BLD VENIPUNCTURE: CPT | Performed by: STUDENT IN AN ORGANIZED HEALTH CARE EDUCATION/TRAINING PROGRAM

## 2021-08-09 RX ORDER — OXYCODONE AND ACETAMINOPHEN 5; 325 MG/1; MG/1
TABLET ORAL
COMMUNITY
Start: 2021-08-05 | End: 2023-02-03

## 2021-08-11 ENCOUNTER — TELEPHONE (OUTPATIENT)
Dept: NURSING | Facility: CLINIC | Age: 56
End: 2021-08-11

## 2021-08-11 ENCOUNTER — TELEPHONE (OUTPATIENT)
Dept: FAMILY MEDICINE | Facility: CLINIC | Age: 56
End: 2021-08-11

## 2021-08-11 DIAGNOSIS — D69.6 THROMBOCYTOPENIA (H): Primary | ICD-10-CM

## 2021-08-11 NOTE — TELEPHONE ENCOUNTER
----- Message from Mara Craig DO sent at 8/11/2021  9:43 AM CDT -----  Please call patient and let him know:     Doesn't need to have any additional antibiotics for latent TB.     Chest Xray and CBC looks good. His platelets are slightly low but may be in the setting of acute surgery recently. Would repeat in 1 month.

## 2021-08-11 NOTE — TELEPHONE ENCOUNTER
Left message for patient to call back. When he calls back please relay message below and assist as needed.

## 2021-08-11 NOTE — TELEPHONE ENCOUNTER
Patient returning call to clinic. Relayed message from Mara Craig DO.  Message from Mara Craig DO sent at 8/11/2021  9:43 AM CDT -----  Please call patient and let him know:      Doesn't need to have any additional antibiotics for latent TB.      Chest Xray and CBC looks good. His platelets are slightly low but may be in the setting of acute surgery recently. Would repeat in 1 month.     No future lab order in chart for repeat CBC. Routing message to provider.   Nancy Daily RN   08/11/21 4:37 PM  [facility] Nurse Advisor

## 2021-08-12 PROBLEM — D69.6 THROMBOCYTOPENIA (H): Status: ACTIVE | Noted: 2021-08-12

## 2021-08-12 NOTE — PROGRESS NOTES
"Romina Rivera is a 56 year old male who is being evaluated via a billable video visit.       The patient has been notified of following:      \"This video visit will be conducted via a call between you and your physician/provider. We have found that certain health care needs can be provided without the need for an in-person physical exam.  This service lets us provide the care you need with a video conversation.  If a prescription is necessary we can send it directly to your pharmacy.  If lab work is needed we can place an order for that and you can then stop by our lab to have the test done at a later time.     Video visits are billed at different rates depending on your insurance coverage.  Please reach out to your insurance provider with any questions.     If during the course of the call the physician/provider feels a video visit is not appropriate, you will not be charged for this service.\"     Patient has given verbal consent for Video visit? Yes  How would you like to obtain your AVS? Mail a copy  If you are dropped from the video visit, the video invite should be resent to: Text to cell phone: [phone]  Will anyone else be joining your video visit? No  If patient encounters technical issues they should call [phone]      Video-Visit Details     Type of service:  Video Visit     Video Start Time: 1:15pm  Video End Time: 1:45pm    Originating Location (pt. Location): Home     Distant Location (provider location):  [facility]      Platform used for Video Visit: Doximity    Virtual visit for hypersomnia, concern for sleep-disordered breathing.     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    Plan:  -Given his comorbid hypertension, hypersomnia, I do feel he is appropriate for home sleep testing in place orders for mailed watch pat home sleep test today.  -In regards to his shiftwork sleep disorder and excessive daytime sleepiness, we did agree to a trial of zaleplon 10 mg with planned bedtime of 8 PM.    SUBJECTIVE:  Romina Rivera is a 56 year old year old male.    Pertinent PMHx of thrombocytopenia, migraines, positive TB test, HTN.    Today -his main sleep concern today is excessive daytime sleepiness and feeling very sleepy during the day.  He feels this started a few months ago.  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  They are hoping to sell the bakery in the near future, but for now he will likely be getting up this time at least 4-5 times per week.  His wife is able to initiate sleep easily around 7 or 8 PM, but he finds it very difficult to fall asleep before 10-11 PM.  If he does not have to wake at 2 AM, he will see go to sleep until 5:55 AM and wake up naturally and does feel less tired on these days.    He has been told by his movement he snores loudly on a regular basis.  He denies any reports of observed apnea.    He is not aware of any family history of sleep apnea.    He denies any abnormal nocturnal behaviors.         Past medical history:    Patient Active Problem List    Diagnosis Date Noted     Thrombocytopenia (H) 08/12/2021     Priority: Medium     Positive QuantiFERON-TB Gold test 08/09/2021     Priority: Medium     Acute conjunctivitis 06/13/2006     Priority: Medium     Formatting of this note might be different from the original.  Epic         Allergic rhinitis 02/09/2005     Priority: Medium     Formatting of this note might  be different from the original.  Allergic rhinitis due to other allergen         Migraine without aura 06/21/2004     Priority: Medium     Formatting of this note might be different from the original.  Epic           10 point ROS of systems including Constitutional, Eyes, Respiratory, Cardiovascular, Gastroenterology, Genitourinary, Integumentary, Muscularskeletal, Psychiatric were all negative except for pertinent positives noted in my HPI.    Current Outpatient Medications   Medication Sig Dispense Refill     diazepam (VALIUM) 10 MG tablet Take 1 tablet (10 mg) by mouth 4 times daily 12 tablet 0     fluticasone (VERAMYST) 27.5 mcg/actuation nasal spray [FLUTICASONE (VERAMYST) 27.5 MCG/ACTUATION NASAL SPRAY] 2 sprays in each nostril once daily 10 g 12     losartan (COZAAR) 50 MG tablet [LOSARTAN (COZAAR) 50 MG TABLET] TAKE 1 PILL BY MOUTH DAILY FOR BLOOD PRESSURE / TXHUA HNUB NOJ 1 LUB TSHUAJ PAB ZOO TAMIR NTSHAV SIAB 90 tablet 3     multivitamin (ONE A DAY) per tablet [MULTIVITAMIN (ONE A DAY) PER TABLET] 1 cap(s)       oxyCODONE-acetaminophen (PERCOCET) 5-325 MG tablet TAKE 1 TO 2 TABLETS EVERY 4 TO 6 HOURS AS NEEDED FOR PAIN./ YOG MOB TXHUA 4-6 TEEV NOJ 1-2 LUB TSHUAJ         OBJECTIVE:  There were no vitals taken for this visit.    Physical Exam     ---  This note was written with the assistance of the Dragon voice-dictation technology software. The final document, although reviewed, may contain errors. For corrections, please contact the office.    Coleman Jewell MD    Sleep Medicine  [facility]  ([phone])  [facility]  ([phone])

## 2021-08-13 ENCOUNTER — VIRTUAL VISIT (OUTPATIENT)
Dept: SLEEP MEDICINE | Facility: CLINIC | Age: 56
End: 2021-08-13
Payer: COMMERCIAL

## 2021-08-13 DIAGNOSIS — G47.26 SHIFT WORK SLEEP DISORDER: ICD-10-CM

## 2021-08-13 DIAGNOSIS — G47.10 HYPERSOMNIA: ICD-10-CM

## 2021-08-13 DIAGNOSIS — G47.30 SLEEP-DISORDERED BREATHING: ICD-10-CM

## 2021-08-13 DIAGNOSIS — I10 ESSENTIAL HYPERTENSION: ICD-10-CM

## 2021-08-13 DIAGNOSIS — R06.83 SNORING: Primary | ICD-10-CM

## 2021-08-13 PROCEDURE — 99204 OFFICE O/P NEW MOD 45 MIN: CPT | Mod: 95 | Performed by: FAMILY MEDICINE

## 2021-08-13 RX ORDER — ZALEPLON 10 MG/1
10 CAPSULE ORAL AT BEDTIME
Qty: 30 CAPSULE | Refills: 3 | Status: SHIPPED | OUTPATIENT
Start: 2021-08-13

## 2021-08-13 NOTE — PROGRESS NOTES
Watchpat order forwarded to Nicole sharma.     AVS has been mailed to patients home address.    Noemy DENNISON CMA, SLEEP MEDICINE, 8/13/2021 3:54 PM

## 2021-08-17 ENCOUNTER — TELEPHONE (OUTPATIENT)
Dept: SLEEP MEDICINE | Facility: CLINIC | Age: 56
End: 2021-08-17

## 2021-08-17 NOTE — TELEPHONE ENCOUNTER
----- Message from Joan Fiore CMA sent at 8/13/2021  3:51 PM CDT -----  Regarding: Watchpat ordered  Bao Rivera. Could you please schedule HST Watchpat mail out, and 2.5 week follow up visit with Dr. Jewell for test results?     Insurance: [employer]    Thank you-    Noemy DENNISON CMA, SLEEP MEDICINE, 8/13/2021 3:52 PM

## 2021-10-16 ENCOUNTER — HEALTH MAINTENANCE LETTER (OUTPATIENT)
Age: 56
End: 2021-10-16

## 2022-10-01 ENCOUNTER — HEALTH MAINTENANCE LETTER (OUTPATIENT)
Age: 57
End: 2022-10-01

## 2023-01-12 ENCOUNTER — OFFICE VISIT (OUTPATIENT)
Dept: FAMILY MEDICINE | Facility: CLINIC | Age: 58
End: 2023-01-12
Payer: COMMERCIAL

## 2023-01-12 VITALS
OXYGEN SATURATION: 95 % | HEIGHT: 64 IN | TEMPERATURE: 98.7 F | HEART RATE: 71 BPM | DIASTOLIC BLOOD PRESSURE: 88 MMHG | BODY MASS INDEX: 27.9 KG/M2 | RESPIRATION RATE: 18 BRPM | WEIGHT: 163.4 LBS | SYSTOLIC BLOOD PRESSURE: 152 MMHG

## 2023-01-12 DIAGNOSIS — R10.30 LOWER ABDOMINAL PAIN: Primary | ICD-10-CM

## 2023-01-12 PROCEDURE — 99213 OFFICE O/P EST LOW 20 MIN: CPT | Performed by: INTERNAL MEDICINE

## 2023-01-12 ASSESSMENT — PAIN SCALES - GENERAL: PAINLEVEL: EXTREME PAIN (9)

## 2023-01-12 NOTE — PROGRESS NOTES
"  Assessment & Plan     1.  Lower abdominal and left flank pain.  Differential diagnoses include bladder outlet obstruction with bladder retention, obstruction left ureter with possible hydronephrosis left side.  Bladder infection also possible.  Some other pelvic lesion secondary affecting the urogenital system is also possibility.  Inform the patient that for further work-up he will need urinalysis, CBC, BMP and other complete kidney bladder ultrasound or CT abdomen and pelvis with contrast.  Inform patient that I could not accomplish that tonight except for the blood work and urine analysis.  Since he was quite symptomatic his best option was to the ER.  The closest ER is [facility].  Patient agreed to go to ER.         BMI:   Estimated body mass index is 28.05 kg/m  as calculated from the following:    Height as of this encounter: 1.626 m (5' 4\").    Weight as of this encounter: 74.1 kg (163 lb 6.4 oz).           No follow-ups on file.    Alex Boucher MD  [facility]   Romina is a 57 year old, presenting for the following health issues:  Pelvic Pain    Pt stated it is painful to urinate today. Pain radiates from pelvis to back.    History of Present Illness       Reason for visit:  Pelvic pain  Symptom onset:  3-7 days ago  Symptoms include:  Pain around left hip. Sometime pain will travel to lower back on left side. Pt had to hold breathe to walk.  Symptom intensity:  Severe  Symptom progression:  Worsening  Had these symptoms before:  No  What makes it worse:  Walking fast  What makes it better:  Pressure on pain site    He eats 2-3 servings of fruits and vegetables daily.He consumes 0 sweetened beverage(s) daily.He exercises with enough effort to increase his heart rate 9 or less minutes per day.  He exercises with enough effort to increase his heart rate 3 or less days per week.   He is taking medications regularly.     57-year-old gentleman " "presents with pelvic pain or discomfort that he has been feeling since Monday.  This is a 4 today.  His symptoms have gotten much worse today.  It feels like a distended balloon in the pelvis.  He said now the discomfort has gone into his left flank and he feels pain there.  He has no fever or chills.  The last few days he has noticed that his urine nation is painful and that he takes longer to empty his bladder.  Stream is slower and he dribbles.  Prior to this episode he did not have issue urinating.  There is no history of benign prostate hypertrophy.  Denies nocturia.  No nausea or vomiting.    Review of Systems   Constitutional, HEENT, cardiovascular, pulmonary, GI, , musculoskeletal, neuro, skin, endocrine and psych systems are negative, except as otherwise noted.      Objective    BP (!) 152/88   Pulse 71   Temp 98.7  F (37.1  C) (Oral)   Resp 18   Ht 1.626 m (5' 4\")   Wt 74.1 kg (163 lb 6.4 oz)   SpO2 95%   BMI 28.05 kg/m    Body mass index is 28.05 kg/m .  Physical Exam   GENERAL: healthy, alert and no distress  RESP: lungs clear to auscultation - no rales, rhonchi or wheezes  CV: regular rate and rhythm, normal S1 S2, no S3 or S4, no murmur, click or rub, no peripheral edema and peripheral pulses strong  ABDOMEN: Soft bowel sounds active.  On palpation there is definite tenderness in the left flank and CVA region.  There is some tenderness in the suprapubic region of the abdomen as well.  No guarding or rigidity.  No mass felt.  Genitalia normal.                  "

## 2023-01-13 DIAGNOSIS — R69 DIAGNOSIS UNKNOWN: Primary | ICD-10-CM

## 2023-01-26 ENCOUNTER — LAB (OUTPATIENT)
Dept: LAB | Facility: HOSPITAL | Age: 58
End: 2023-01-26
Payer: COMMERCIAL

## 2023-01-26 DIAGNOSIS — N20.1 CALCULUS OF URETER: ICD-10-CM

## 2023-01-26 DIAGNOSIS — N20.1 CALCULUS OF URETER: Primary | ICD-10-CM

## 2023-01-26 PROCEDURE — 82365 CALCULUS SPECTROSCOPY: CPT

## 2023-01-27 ENCOUNTER — HOSPITAL ENCOUNTER (OUTPATIENT)
Facility: HOSPITAL | Age: 58
End: 2023-01-27
Payer: COMMERCIAL

## 2023-01-31 LAB
APPEARANCE STONE: NORMAL
COMPN STONE: NORMAL
SPECIMEN WT: 47 MG

## 2023-02-03 ENCOUNTER — VIRTUAL VISIT (OUTPATIENT)
Dept: UROLOGY | Facility: CLINIC | Age: 58
End: 2023-02-03
Payer: COMMERCIAL

## 2023-02-03 DIAGNOSIS — N20.1 CALCULUS OF URETER: Primary | ICD-10-CM

## 2023-02-03 DIAGNOSIS — N13.2 HYDRONEPHROSIS WITH URINARY OBSTRUCTION DUE TO URETERAL CALCULUS: ICD-10-CM

## 2023-02-03 PROCEDURE — 99203 OFFICE O/P NEW LOW 30 MIN: CPT | Mod: 95 | Performed by: PHYSICIAN ASSISTANT

## 2023-02-03 NOTE — PATIENT INSTRUCTIONS
Calcium Oxalate Stone Prevention Self Management    Drink more fluids:    Drinking more liquids is the best way you can help prevent future stones. Stones can form when substances in the urine are too concentrated. The more you drink, the more urine you will make. This means all substances in the urine will be less concentrated.    How much urine should I be producing?    The usual recommended daily urine production is about 2 to 3 quarts (7003-6065 ml). If you are producing more than 3 quarts of urine on a regular basis, it is possible to deplete important minerals stored in the body.    To measure the amount of urine you produce in a day, you can either:  Collect all urine in a container and measure at the end of the day   Use a measuring cup each time you urinate and add up the amounts at the end of the day     Observe  Color - Dark ritika urine is concentrated. Light straw color or lighter is dilute and desirable   Odor - Concentrated urine tends to smell stronger. Dilute urine is nearly odorless    Ways to increase your fluid intake    Increasing the amount of fluid you drink is effective for all types of kidney stones. While water is commonly recommended, all fluids are effective for increasing the amount of urine your body produces.  Focus on starting a lifelong habit, rather than a short-term solution.   Keep liquids on hand that you like. Crystal Light is a low calorie appropriate choice.  Drink out of larger glasses. You'll tend to drink more with each serving.   Have an additional glass of fluid with each meal.   Keep a water or drink bottle at work and fill it regularly.     *If you are prone to fluid retention, consult your doctor before making changes to your fluid habits.    Low Oxalate Diet:    Avoid excess amounts or daily consumption of these foods:  All nuts and nut products including peanuts, almonds, pecans, peanut butter, almond milk  Rhubarb  Chocolate  Soybeans and soy products    Spinach  Wheat Germ  Beets    Maintain a normal calcium diet:    Researches have found that people with low calcium intakes tend to have more stones. Foods with high calcium content are acceptable and include:  Dairy products (including milk, cheese and yogurt)  Meat and fish  Enriched cereals  Dark green vegetables    What about calcium supplements?     Many people take calcium supplements, either on their own or as prescribed by a doctor. Research has indicated that calcium supplements do not usually pose a risk for stone formation.  Calcium citrate is a better choice for a supplement.    Avoid excess salt:    Salt (sodium chloride) is found in abundance in many foods. High sodium levels in the urine can interfere with the kidney's handling of calcium.     Tips for reducing the salt in your diet:  Don't use salt at the table  Reduce the salt used in food preparation. Try 1/2 teaspoon when recipes call for 1 teaspoon.  Use herbs and spices for flavoring instead of salt.  Avoid salty foods.  Check the label before you buy or use a product. Note sodium and portion size information.  Try to consume less than 2,000 mg/day. (1 teaspoon = 2,000 mg)    Foods with high sodium content include:  Processed meat (including luncheon meats, sausage)   Crackers   Instant cereal   Processed cheese   Canned soups   Chips and snack foods   Soy sauce    [facility] can respond to your questions or concerns 24 hours a day at [phone].

## 2023-02-03 NOTE — PROGRESS NOTES
Patient is roomed via telephone for a virtual visit.  Patient confirmed he is in the [facility] at the time of this appointment.  Patient understand that this visit is billable and agree to proceed with appointment.

## 2023-02-03 NOTE — PROGRESS NOTES
Assessment/Plan:    Assessment & Plan   Romina was seen today for new patient.    Diagnoses and all orders for this visit:    Calculus of ureter    Hydronephrosis with urinary obstruction due to ureteral calculus    Other orders  -     Adult Urology  Referral    Stone Management Plan  Stone Management 2/3/2023   Urinary Tract Infection No suspicion of infection   Renal Colic Asymptomatic at this time   Renal Failure No suspicion of renal failure   Current CT date 1/13/2023   Right sided stones? No   R Stone Event No current event   Left sided stones? Yes   L Number of ureteral stones 1   L GSD of ureteral stones 6   L Location of ureteral stone Distal   L Hydronephrosis Moderate   L Stone Event New stone passed prior to visit   Diagnosis date 1/13/2023   Initial location of primary symptomatic stone Distal   Initial GSD of primary symptomatic stone 6   Resolved date 1/31/2023         PLAN    58 yo pleasant M first time calcium based stone former with interval passage of left distal ureteral stone. No current stone burden.    We reviewed dietary measures for prevention, focusing on high fluid intake. Patient verbalized understanding. Patient agrees with plan as discussed. He is happy to follow up on a prn basis.      Video call duration: 13 minutes; start time 14:06, stop time 15:19  Distant site (provider site): off-site, patient at home  21 minutes spent on the date of the encounter doing chart review, history and exam, documentation and further activities per the note    Yvrose Lawson PA-C  [facility]    Subjective:     HPI  Mr. Romina Rivera is a 57 year old male who is being evaluated via a billable video visit by [facility] following ER visit for urolithiasis.    He is a first time calcium oxalate and calcium phosphate (apatite) stone former. He has no identified modifiable stone risk factors. He has no identified non-modifiable stone risk  factors.    He was seen in clinic 1/12/23 for acute onset pelvic pain starting 4 days prior. The pain was constant, sharp and radiated to the left flank. It was 8/10 at its worst with no provoking or palliating factors. He had associated dysuria and slowed stream with dribbling. He was referred to ER to pursue further workup with imaging. A CT was done through [facility]  which reported an obstructing left ureteral stone. Labs showed mild renal injury without infection. He was sent home with flomax, and oxycodone.    He passed the stone in interval and has analysis results to discuss. He is asymptomatic at present. He denies symptoms of fever, chills, flank pain, nausea, vomiting, urinary frequency and dysuria.     CT scan from 1/13/23 is personally reviewed and demonstrates a moderately obstructing 6 mm left UVJ stone.    Significant labs from presentation include mild hematuria, no pyuria, negative nitrite, normal WBC, slightly elevated creatinine and normal potassium.    Stone analysis from 1/31/23 is personally reviewed and demonstrates 70% CaOx (dihydrate) and 30 % CaP (apatite).    ROS   A 12 point comprehensive review of systems is negative except for HPI    Past Medical History:   Diagnosis Date     Hypertension      Uncomplicated asthma     Seasonal       Past Surgical History:   Procedure Laterality Date     HEMORRHOIDECTOMY EXTERNAL N/A 8/5/2021    Procedure: EXAM UNDER ANESTHESIA WITH HEMORRHOIDECTOMY;  Surgeon: Manish Laureano MD;  Location: [facility] OR       Current Outpatient Medications   Medication Sig Dispense Refill     diazepam (VALIUM) 10 MG tablet Take 1 tablet (10 mg) by mouth 4 times daily 12 tablet 0     fluticasone (VERAMYST) 27.5 mcg/actuation nasal spray [FLUTICASONE (VERAMYST) 27.5 MCG/ACTUATION NASAL SPRAY] 2 sprays in each nostril once daily 10 g 12     losartan (COZAAR) 50 MG tablet [LOSARTAN (COZAAR) 50 MG TABLET] TAKE 1 PILL BY MOUTH DAILY FOR BLOOD PRESSURE / TXHUA HNUB  NOJ 1 LUB TSHUAJ PAB ZOO TAMIR NTSHAV SIAB 90 tablet 3     multivitamin (ONE A DAY) per tablet [MULTIVITAMIN (ONE A DAY) PER TABLET] 1 cap(s)       zaleplon (SONATA) 10 MG capsule Take 1 capsule (10 mg) by mouth At Bedtime 30 capsule 3       Allergies   Allergen Reactions     Dust Mites      Eggs-Apples-Oats [Fiber] Unknown     Mold [Molds & Smuts] Unknown     Pollens Extract [Pollen Extract] Unknown     Wheat [Wheat Bran] Unknown       Social History     Socioeconomic History     Marital status:      Spouse name: Not on file     Number of children: Not on file     Years of education: Not on file     Highest education level: Not on file   Occupational History     Not on file   Tobacco Use     Smoking status: Never     Smokeless tobacco: Never   Vaping Use     Vaping Use: Never used   Substance and Sexual Activity     Alcohol use: Yes     Drug use: Never     Sexual activity: Not on file   Other Topics Concern     Not on file   Social History Narrative     Not on file     Social Determinants of Health     Financial Resource Strain: Not on file   Food Insecurity: Not on file   Transportation Needs: Not on file   Physical Activity: Not on file   Stress: Not on file   Social Connections: Not on file   Intimate Partner Violence: Not on file   Housing Stability: Not on file       No family history on file.    Objective:     No vitals or physical exam obtained due to virtual visit    LABS  Most Recent 3 CBC's:  Recent Labs   Lab Test 08/09/21  1433 10/15/19  0809   WBC 8.0 5.6   HGB 14.9 15.5   MCV 88 93   * 141     Most Recent 3 BMP's:  Recent Labs   Lab Test 07/28/21  1117 10/15/19  0809 01/05/18  1649    139 141   POTASSIUM 3.8 4.0 3.8   CHLORIDE 105 103 106   CO2 28 26 27   BUN 19 15 19   CR 0.97 0.97 1.02   ANIONGAP 8 10 8   COLLETTE 8.9 9.1 9.0   GLC 67* 93 81     Most Recent Urinalysis:No lab results found.

## 2023-02-05 ENCOUNTER — HEALTH MAINTENANCE LETTER (OUTPATIENT)
Age: 58
End: 2023-02-05

## 2023-04-07 NOTE — PATIENT INSTRUCTIONS - HE
Advised health diet, 6-8 fruit and vegetables daily and discussed juicing in am with protein powder for breakfast.  Advised regular exercise, discussed regular aerobic exercise and strength training.  Advised not more than 1 alcoholic drinks in any given day.  Caffeine: not more than 2 daily.  Water: 6-8 glasses daily.  Multivitamin with Vitamin D 2000 units and iron for women.       Left message for the patient to call back to schedule follow up with PCP to go over lab results.

## 2024-03-03 ENCOUNTER — HEALTH MAINTENANCE LETTER (OUTPATIENT)
Age: 59
End: 2024-03-03

## 2025-03-15 ENCOUNTER — HEALTH MAINTENANCE LETTER (OUTPATIENT)
Age: 60
End: 2025-03-15